# Patient Record
Sex: FEMALE | Race: WHITE | NOT HISPANIC OR LATINO | Employment: FULL TIME | URBAN - NONMETROPOLITAN AREA
[De-identification: names, ages, dates, MRNs, and addresses within clinical notes are randomized per-mention and may not be internally consistent; named-entity substitution may affect disease eponyms.]

---
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
## 2018-01-05 NOTE — NURSING NOTE
Patient Information     Patient Name MRN Christy Munguia [id number] Female 2000      Nursing Note by Keily Mueller at 2017 12:45 PM     Author:  Keily Mueller Service:  (none) Author Type:  (none)     Filed:  2017  1:16 PM Encounter Date:  2017 Status:  Signed     :  Keily Mueller            Patient will have her yearly eye exam done this summer.  Keily Mueller CMA (AAMA)................ 2017 1:04 PM

## 2018-01-05 NOTE — PROGRESS NOTES
"Patient Information     Patient Name MRChristy Pate [id number] Female 2000      Progress Notes by Keily Mueller at 2017  1:04 PM     Author:  Keily Mueller Service:  (none) Author Type:  (none)     Filed:  2017  6:02 PM Encounter Date:  2017 Status:  Signed     :  Charley Gonzales MD (Physician)            Chari is here with her mother for well visit.  Chari just finished her freshman year at the [employer]. She states that she did have a very rough first year academically. She was used to being at the very top of her class in high school. She states that some of her classes were very difficult and she struggled to pass them. She is having a hard time accepting being \"average.\" She has made a good group of friends. She denies any sexual activity or any sexual assaults of any sort. She does feel that she has a good support structure with her friends and family. She states that she has had some thoughts about suicide, but feels very confident that she would never act on this.    She also complains that she has had some heavier periods off and on in the past few months. Discussed birth control as a way of lightening the flow and shortening the duration of periods. She would be interested in this.      DEVELOPMENT  Social:     enjoys school: yes    performance consistent: yes    interaction with peers: yes  Fine Motor:     able to complete age specific tasks: yes  Language:     communication skills are normal: yes  Gross Motor:     normal: yes    participates in extracurricular activities: yes  Answers provided by: mother & patient.  Above information obtained by:  Charley Gonzales MD     HOME HISTORY  Christy Bailey lives with her both parents during summer - on campus during school year.   Nutrition:   Does child have a source of calcium, Vitamin D, protein and iron in diet? yes.   Iron sources in diet, such as meats, cereal or dark " green, leafy vegetables: yes   Christy eats breakfast: yes  Has fluoride been applied to your (if asking patient) or your child's (if asking parent) teeth since January 1 of THIS year? no  Sleep concerns: no  Vision or hearing concerns: no  Do you or your child feel safe in your environment? yes  If there are weapons in the home, are they safely stored? no  Do you have any concerns about you (if asking patient) or your child (if asking parent) being exposed to Tuberculosis (TB): no   Seat belt used 100% of the time  School Name/Occupation: college - just finished first year.  Violence at school/work: no  Exposure to Drugs/alcohol at school/work: no; personal use: no  Do you have any concerns regarding mental health issues in your child, yourself, or a family member: yes.  Above information obtained by:  Charley Gonzales MD      Vaccines for Children Patient Eligibility Screening  Is patient eligible for the Vaccines for Children Program? No, patient has insurance that covers the cost of all vaccines.  Patient received a handout explaining the [facility] program eligibility categories and who to contact with billing questions.    HPI   Christy Bailey is a 16 y.o. female here for a Well Child Exam. She is brought here by her mother. Concerns raised today include see above. Nursing notes reviewed: yes    DEVELOPMENT  This child's development was assessed today using [employer] and the results showed normal development    COMPLETE REVIEW OF SYSTEMS  General: Normal; no fever, no loss of appetite, no change in activity level.  Eyes: Normal; no redness. No concerns about eyes or vision.  Ears: Normal; No concerns about ears or hearing  Nose: Normal; no significant congestion.  Throat: Normal; No concerns about mouth and throat  Respiratory: Normal; no persistent coughing, wheezing, or troubled breathing.  Cardiovascular: Normal; no excessive fatigue or syncope with activity   GI: Normal; BMs normal.  Genitourinary:  Normal; normal urine output   Musculoskeletal: Normal; no concerns.  Neuro: Normal; no abnormal movements    Skin: Normal; no rashes or lesions noted   Psych: See above.    Problem List  Patient Active Problem List       Diagnosis  Date Noted     Menorrhagia with regular cycle  2017     Mild depression (HC)  2017     DERMATITIS  2012     MURMUR       Still's murmur.          Current Medications:    Medications have been reviewed by me and are current to the best of my knowledge and ability.     Histories    Family History       Problem   Relation Age of Onset     GI Disease  Mother      ulcerative colitis       Allergies  Mother      seasonal       Asthma  Mother      Good Health  Father      Other  Brother      complicated  course.  See chart for details.        Good Health  Sister      Other  Paternal Grandfather      sarcoidosis       Cancer-ovarian  Paternal Grandmother 52      of ovarian cancer. diabetes on her side of family       Social History     Social History        Marital status:  Single     Spouse name: N/A     Number of children:  N/A     Years of education:  N/A     Social History Main Topics       Smoking status: Never Smoker     Smokeless tobacco: Never Used     Alcohol use No     Drug use: No     Sexual activity: No     Other Topics  Concern     Not on file      Social History Narrative     She lives with mother (Diana), father (Adonay), one sister, and one brother.  She is the oldest child.  ~Mom Diana.  ~Dad Adonay.  ~Sister Temo (Nina).  Date of birth:  [birth year].  ~Brother Maynor.  Date of birth:  [birth year].    Attending [employer] 2016, plans to pursue Engineering degree.      No past surgical history on file.   Family, Social, and Medical/Surgical history reviewed: yes  Allergies: Review of patient's allergies indicates no known allergies.     Immunization Status  Immunization Status Reviewed: yes  Immunizations up to date: yes  Counseled parent  "about risks and benefits of no vaccinations today.    PHYSICAL EXAM  /74  Temp 98.6  F (37  C) (Tympanic)  Ht 1.651 m (5' 5\")  Wt 52.2 kg (115 lb)  LMP 05/08/2017  Breastfeeding? No  BMI 19.14 kg/m2  Growth Percentiles  Length: 61 %ile based on [facility] 2-20 Years stature-for-age data using vitals from 5/22/2017.   Weight: 27 %ile based on CDC 2-20 Years weight-for-age data using vitals from 5/22/2017.   Weight for length: Normalized weight-for-recumbent length data not available for patients older than 36 months.  BMI: Body mass index is 19.14 kg/(m^2).  BMI for age: 18 %ile based on CDC 2-20 Years BMI-for-age data using vitals from 5/22/2017.    GENERAL: Normal; alert, interactive, well developed adolescent.   HEAD: Normal; normal shaped head.   EYES: \"Normal; Pupils equal, round and reactive to light  EARS: Normal; normally formed ears. TMs normal.  NOSE: Normal; no significant rhinorrhea.  OROPHARYNX:  Normal; mouth and throat normal. Normal dentition.  NECK: Normal; supple, no masses.  LYMPH NODES: Normal.  BREASTS: Austyn Stage 5  CHEST: Normal; normal to inspection.  LUNGS: Normal; no wheezes, rales, rhonchi or retractions. Breath sounds symmetrical.  CARDIOVASCULAR: Normal; no murmurs noted  ABDOMEN: Normal; soft, nontender, without masses. No enlargement of liver or spleen.  HIPS: Normal.  SPINE: Normal; no curvature.  EXTREMITIES: Normal.  SKIN: Normal; no rashes, normal color.  NEURO: Normal; grossly intact, no focal deficits.  PSYCH: Christy is alert and oriented, affect appropriate to content of speech and circumstances, mood appropriate.    PHQ Depression Screen  Date of PHQ exam: 05/22/17  Over the last 2 weeks, how often have you been bothered by any of the following problems?  1. Little interest or pleasure in doing things: 2 - More than half the days  2. Feeling down, depressed, or hopeless: 1 - Several days  3. Trouble falling or staying asleep, or sleeping too much: 0 - Not at all  4. " Feeling tired or having little energy: 1 - Several days  5. Poor appetite or overeating: 3 - Nearly every day  6. Feeling bad about yourself - or that you are a failure or have let yourself or your family down: 2 - More than half the days  7. Trouble concentrating on things, such as reading the newspaper or watching television: 0 - Not at all  8. Moving or speaking so slowly that other people could have noticed. Or the opposite - being so fidgety or restless that you have been moving around a lot more than usual: 0 - Not at all  9. Thoughts that you would be better off dead, or of hurting yourself in some way: 0 - Not at all    PHQ-9 TOTAL SCORE: 9  Depression Severity Level: mild  If any answers were positive, how difficult have these problems made it for you to do your work, take care of things at home, or get along with other people: not difficult at all      TOMMY Score and Severity  TOMMY-7 SCORE: 4  ANXIETY SEVERITY LEVEL: none      ANTICIPATORY GUIDANCE  Written standard Anticipatory Guidance material given to caregiver. yes     ASSESSMENT/PLAN:    Well 16 y.o. adolescent with normal growth and normal development.   Patient's BMI is 18 %ile based on CDC 2-20 Years BMI-for-age data using vitals from 5/22/2017. Counseling about nutrition and physical activity provided to patient and/or parent.     ICD-10-CM    1. Encounter for routine child health examination without abnormal findings Z00.129 AMB CONSULT TO OTHER MENTAL HEALTH   2. Mild depression (HC) F32.0 FLUoxetine (PROZAC) 10 mg capsule   3. Menorrhagia with regular cycle N92.0 norgestrel-ethinyl estradiol, 0.3-30 mg-mcg, (LO-OVRAL) 0.3-30 mg-mcg tablet     Sports PE done today: no  Copy of sports PE scanned into chart: no  Schedule next well visit at 17 years of age.  Discussed options for treatment of her depression to include counseling, medications or both. She had been seeing some difficulty for counseling when she was at college and found this helpful.  She is referred again to see somebody here locally over the summer. She was also interested in trying a medication. Low-dose Prozac 10 mg by mouth daily was given. She should follow-up in one month to recheck how she is doing. She may follow up sooner if any concerns develop. She does contract for safety at this time.  Prescription for Lo/Ovral given to help regulate her periods. Since she is not yet sexually active, no STD testing was completed at this time.  Charley Gonzales MD

## 2018-01-05 NOTE — PATIENT INSTRUCTIONS
Patient Information     Patient Name MRChristy Pate [id number] Female 2000      Patient Instructions by Charley Gonzales MD at 2017  1:15 PM     Author:  Charley Gonzales MD Service:  (none) Author Type:  Physician     Filed:  2017  1:15 PM Encounter Date:  2017 Status:  Signed     :  Charley Gonzales MD (Physician)              Growth Percentiles  Weight: 27 %ile based on CDC 2-20 Years weight-for-age data using vitals from 2017.  Height: 61 %ile based on CDC 2-20 Years stature-for-age data using vitals from 2017.  BMI: Body mass index is 19.14 kg/(m^2). 18 %ile based on CDC 2-20 Years BMI-for-age data using vitals from 2017.     Health and Wellness: 12 to 18 Years    Immunizations (Shots) Today  Your child may receive these shots at this time:    Tdap (tetanus, diphtheria, and acellular pertussis): ages 11 to 12 years    Influenza  Your child may be eligible for:     MCV4 (meningococcal conjugate vaccine, quadrivalent): ages 11 to 12 years and age 16 years    HPV (human papilloma virus vaccine)    2 dose series: ages 11 to 14 years    3 dose series: ages 15 to 26 years    Talk with your health care provider for information about giving acetaminophen (Tylenol ) before and after your child s immunizations.    Development    All aspects of your child s development (physical, social and mental skills) will continue to grow.    Your child may have questions or concerns about puberty and sexual health. Girls will need to be prepared for menstruation.    Friendships will become more important. Peer pressure may begin or continue.    The American Academy of Pediatrics (AAP) recommends setting a screen time limit that is right for your child and the whole family.     Screen time includes watching television and using cellphones, video games, computers and other electronic devices.    It s important that screen time never replaces healthful  behaviors such as physical activity, sleep and interaction with others.    The AAP advises keeping all electronic devices out of children's bedrooms.    Continue a routine for talking about school and doing homework. The AAP advises you not let your child watch TV while doing homework.    Encourage your child to read for pleasure. This time should be free of television, texting and other distractions. Reading helps your child get ready to talk, improves your child's word skills and teaches him or her to listen and learn. The amount of language your child is exposed to in early years has a lot to do with how he or she will develop and succeed.      Teach your child respect for property and other people.    Give your child opportunities for independence within set boundaries.    Talk honestly with your child about responsibilities and expectations around: school and homework, dating, driving, activities outside of school, keeping a job.    Food and Beverages    Children ages 12 to 18 need between 1,600 to 2,500 calories each day. (Active children need more.) A total of 25 to 35 percent of total calories should come from fats.    Between ages 12 to 18 years, your child needs 1,300 milligrams (mg) of calcium each day. She can get this requirement by drinking 3 cups of low-fat or fat-free milk, plus servings of other foods high in calcium (such as yogurt, cheese, orange juice or soy milk with added calcium, broccoli, and almonds) or by taking a calcium supplement.    Your child needs at least 600 IU of vitamin D daily.    Between ages 12 to 13 years, boys and girls need 8 mg of iron a day. After age 13, the recommended requirement changes:    boys 14 to 18 years: 11 mg    girls 14 to 18 years: 15 mg     Lean beef, iron-fortified cereal, oatmeal, soybeans, spinach and tofu are good sources of iron.    Breakfast is important. Make sure your child eats a healthful breakfast every morning.    Help your child choose  fiber-rich fruits, vegetables and whole grains. Choose and prepare foods and beverages with little added sugars or sweeteners.    Offer your child healthful snacks such as fruits, vegetables, healthful cereals, yogurt, pudding, turkey, peanut butter sandwich, fruit smoothie, or cheese. Avoid foods high in sugar or fat.    Limit soft drinks and sweetened beverages (including juice) to no more than one a day. Limit sweets, treats, snack foods (such as chips), fast foods and fried foods.    Exercise    The American Heart Association recommends children get 60 minutes of moderate to vigorous physical activity each day. If your child s school does not offer regular physical education classes, organize daily family activities (such as walking or bike riding) or consider enrolling him or her in classes, team sports, or community education activities.    In addition to helping build strong bones and muscles, regular exercise can reduce risks of certain diseases, reduce stress levels, increase self-esteem, help maintain a healthy weight, improve concentration, and help maintain good cholesterol levels.    Even if your child doesn t think it s  cool,  she needs to wear the right safety gear for her activities, such as a helmet, mouth guard, knee pads, eye protection or life vest.     You can find more information on health and wellness for children and teens at healthpoweredkids.org.    Sleep    Children ages 12 to 18 need at least 9   hours of sleep each night on a regular basis.    Your child should continue a sleep routine (such as washing her face and brushing teeth).    It is still important to keep a regular sleep and waking schedule. Teach your child to get up when called or when the alarm goes off.    Avoid regular exercise, heavy meals and caffeine right before bed.  Safety    Your child needs to be in a belt-positioning booster seat in the back seat until she reaches the height of 4 feet 9 inches or taller.     Once  your child is 4 feet 9 inches or taller, your child can be buckled in the back seat with a lap and shoulder belt. The lap belt must lie snugly across the upper thighs, not the stomach. The shoulder belt should lie snugly across the shoulder and chest and not cross the neck or face.     Keep your child in the back seat at least through age 12.     At 13 years old, your child may ride in the front seat, buckled with a lap and shoulder belt. (Follow directions from your health care provider.) Be sure all other adults and children are buckled as well.    Do not let anyone smoke in your home or around your child.    Talk with your child about the dangers of alcohol, drug and tobacco use.    Make sure your child understands safety guidelines for fire, water, animal safety, firearms, social networking Internet sites, and personal safety (including dating). About one in five high school girls has been physically or sexually abused by a dating partner, according to the American Medical Association.     Self-esteem    Provide support, attention and enthusiasm for your child s abilities, achievements and school activities. Show your child affection.    Get to know your child s friends and their parents.    Let your child try new skills.       Older teenagers may want to begin dating. Set boundaries and talk honestly with your child about your family s values and morals.    Monitor your child for eating disorders. Medical illnesses, they involve abnormal eating behaviors serious enough to cause heart conditions, kidney failure and death. The three most common eating disorders are anorexia nervosa, bulimia nervosa and binge eating disorder. They often develop during adolescent years or early adulthood. The vast majority of people with eating disorders are teenage girls and young women.     For information on how to stress less and help teens live a more balanced life, check out www.changetochill.org.    Discipline    Teach your  child consequences for unacceptable or inappropriate behavior. Talk about your family s values and morals and what is right and wrong.    Use discipline to teach, not punish. Be fair and consistent with discipline.    Never shake or hit your child. If you think you are losing control, make sure your child is safe and take a 10-minute time out. If you are still not calm, call a friend, neighbor or relative to come over and help you. If you have no other options, call [facility] at [phone] or dial 211.    Dental Care    Make sure your child brushes her teeth twice a day and flosses once a day.    Make regular dental appointments for cleanings and checkups.    Your child may be self-conscious if she has crooked teeth. An orthodontist can talk with you about choices for straightening teeth.    Eye Care    Make eye checkups at least every 2 years.       Lab Work  Your child should have the following once between ages 13 to 16 years    Urinalysis - This is a urine test to look for kidney problems, diabetes and/or infection    Hemoglobin - This is a blood test to check for anemia, or low blood iron  Your child should have the following once between ages 17 to 19 years    Cholesterol level - This is a blood test to measure a fat-like substance in the blood.  High total cholesterol can indicate a risk for future heart problem.    Next Well Checkup    Your child should have a yearly well checkup through age 20.    Your child may need these shots:     Influenza    For more information go to www.healthychildren.org       2013 [employer]  AND THE [employer] ARE REGISTERED TR[employer]ARKS OF "[employer]"  OTHER TRADEMARKS USED ARE OWNED BY THEIR RESPECTIVE OWNERS  [id number] (5/12)

## 2018-01-11 ENCOUNTER — OFFICE VISIT - GICH (OUTPATIENT)
Dept: FAMILY MEDICINE | Facility: OTHER | Age: 18
End: 2018-01-11

## 2018-01-11 ENCOUNTER — HISTORY (OUTPATIENT)
Dept: FAMILY MEDICINE | Facility: OTHER | Age: 18
End: 2018-01-11

## 2018-01-11 DIAGNOSIS — F32.0 MAJOR DEPRESSIVE DISORDER, SINGLE EPISODE, MILD (H): ICD-10-CM

## 2018-01-11 ASSESSMENT — PATIENT HEALTH QUESTIONNAIRE - PHQ9: SUM OF ALL RESPONSES TO PHQ QUESTIONS 1-9: 9

## 2018-01-16 ENCOUNTER — AMBULATORY - GICH (OUTPATIENT)
Dept: LAB | Facility: OTHER | Age: 18
End: 2018-01-16

## 2018-01-16 DIAGNOSIS — E55.9 VITAMIN D DEFICIENCY: ICD-10-CM

## 2018-01-16 DIAGNOSIS — R73.9 HYPERGLYCEMIA: ICD-10-CM

## 2018-01-16 LAB
GLUCOSE SERPL-MCNC: 96 MG/DL (ref 70–105)
VITAMIN D TOTAL - HISTORICAL: 37.5 NG/ML

## 2018-01-18 ENCOUNTER — COMMUNICATION - GICH (OUTPATIENT)
Dept: FAMILY MEDICINE | Facility: OTHER | Age: 18
End: 2018-01-18

## 2018-01-26 VITALS
DIASTOLIC BLOOD PRESSURE: 76 MMHG | TEMPERATURE: 98.6 F | BODY MASS INDEX: 19.07 KG/M2 | SYSTOLIC BLOOD PRESSURE: 104 MMHG | WEIGHT: 114.6 LBS

## 2018-01-26 VITALS
BODY MASS INDEX: 18.33 KG/M2 | TEMPERATURE: 98.6 F | SYSTOLIC BLOOD PRESSURE: 106 MMHG | BODY MASS INDEX: 19.16 KG/M2 | HEART RATE: 76 BPM | WEIGHT: 115 LBS | HEIGHT: 65 IN | WEIGHT: 110 LBS | DIASTOLIC BLOOD PRESSURE: 60 MMHG | HEIGHT: 65 IN | DIASTOLIC BLOOD PRESSURE: 74 MMHG | SYSTOLIC BLOOD PRESSURE: 110 MMHG

## 2018-01-31 ASSESSMENT — ANXIETY QUESTIONNAIRES
GAD7 TOTAL SCORE: 4
GAD7 TOTAL SCORE: 1

## 2018-01-31 ASSESSMENT — PATIENT HEALTH QUESTIONNAIRE - PHQ9
SUM OF ALL RESPONSES TO PHQ QUESTIONS 1-9: 6
SUM OF ALL RESPONSES TO PHQ QUESTIONS 1-9: 9
SUM OF ALL RESPONSES TO PHQ QUESTIONS 1-9: 5

## 2018-02-09 VITALS
SYSTOLIC BLOOD PRESSURE: 120 MMHG | WEIGHT: 109.8 LBS | BODY MASS INDEX: 18.27 KG/M2 | DIASTOLIC BLOOD PRESSURE: 70 MMHG | HEART RATE: 84 BPM

## 2018-02-09 VITALS
HEIGHT: 65 IN | BODY MASS INDEX: 18.33 KG/M2 | HEART RATE: 74 BPM | SYSTOLIC BLOOD PRESSURE: 110 MMHG | WEIGHT: 110 LBS | DIASTOLIC BLOOD PRESSURE: 70 MMHG

## 2018-02-11 ASSESSMENT — PATIENT HEALTH QUESTIONNAIRE - PHQ9
SUM OF ALL RESPONSES TO PHQ QUESTIONS 1-9: 16
SUM OF ALL RESPONSES TO PHQ QUESTIONS 1-9: 9

## 2018-02-12 NOTE — ADDENDUM NOTE
Patient Information     Patient Name MRN Christy Munguia [id number] Female 2000      Addendum Note by Charley De La Rosa MD at 2018  3:38 PM     Author:  Charley De La Rosa MD Service:  (none) Author Type:  Physician     Filed:  2018  3:38 PM Encounter Date:  2017 Status:  Signed     :  Charley De La Rosa MD (Physician)       Addended by: CHARLEY DE LA ROSA on: 2018 03:38 PM        Modules accepted: Orders

## 2018-02-12 NOTE — PROGRESS NOTES
Patient Information     Patient Name MRN Sex Christy Chong [id number] Female 2000      Progress Notes by Charley Gonzales MD at 2018  1:15 PM     Author:  Charley Gonzales MD Service:  (none) Author Type:  Physician     Filed:  2018  9:10 AM Encounter Date:  2018 Status:  Signed     :  Charley Gonzales MD (Physician)            SUBJECTIVE:    Christy Bailey is a 17 y.o. female who presents for follow up.  She has been less stressed out since back home again. Has to go back to school at the end of the week to move her things back home.  Still keeps in touch with friends from school.  Has been seeing her counselor.  She sees Abebe Martines at [facility].  Saw him last yesterday.    HPI  I personally reviewed medications/allergies/history listed below:     No Known Allergies,   Family History       Problem   Relation Age of Onset     GI Disease  Mother      ulcerative colitis       Allergies  Mother      seasonal       Asthma  Mother      Good Health  Father      Other  Brother      complicated  course.  See chart for details.        Good Health  Sister      Other  Paternal Grandfather      sarcoidosis       Cancer-ovarian  Paternal Grandmother 52      of ovarian cancer. diabetes on her side of family     ,   Current Outpatient Prescriptions on File Prior to Visit       Medication  Sig Dispense Refill     Cholecalciferol, Vitamin D3, (VITAMIN D-3) 5,000 unit tab Take 1 tablet by mouth once daily. 90 tablet 0     citalopram (CELEXA) 20 mg tablet Take 1/2 by mouth daily x 7 days, then increase to 20 mg daily. 30 tablet 11     medication order composer SAD light.  Diagnosis:  F33.9, F41.8.  Length of need:  99. 1 Each 0     multivitamin (MVI) tablet Take 1 tablet by mouth once daily.  0     norgestrel-ethinyl estradiol, 0.3-30 mg-mcg, (LO-OVRAL) 0.3-30 mg-mcg tablet Take 1 tablet by mouth once daily. 84 tablet 3     No current  "facility-administered medications on file prior to visit.    , ,   Patient Active Problem List     Diagnosis  Code     MURMUR R01.1     DERMATITIS L25.9     Menorrhagia with regular cycle N92.0     Mild depression (HC) F32.0     Iron deficiency E61.1    and No past surgical history on file.  Social History     Social History        Marital status:  Single     Spouse name: N/A     Number of children:  N/A     Years of education:  N/A     Occupational History      Not on file.     Social History Main Topics       Smoking status: Never Smoker     Smokeless tobacco: Never Used     Alcohol use No     Drug use: No     Sexual activity: No     Other Topics  Concern     Not on file      Social History Narrative     She lives with mother (Diana), father (Adonay), one sister, and one brother.  She is the oldest child.  ~Mom Diana.  ~Dad Adonay.  ~Sister Temo (Nina).  Date of birth:  05/[birth year].  ~Brother Maynor.  Date of birth:  [birth year].    Attending [employer] fall 2016, plans to pursue Engineering degree.       REVIEW OF SYSTEMS:  Review of Systems   Constitutional: Negative for chills, fever and malaise/fatigue.   Respiratory: Negative for cough.    Psychiatric/Behavioral: Positive for depression. Negative for hallucinations, substance abuse and suicidal ideas. The patient is nervous/anxious.        OBJECTIVE:  /70 (Cuff Site: Right Arm, Position: Sitting, Cuff Size: Adult Regular)  Pulse 74  Ht 1.651 m (5' 5\")  Wt 49.9 kg (110 lb)  LMP 12/13/2017  BMI 18.3 kg/m2    EXAM:   Physical Exam   Constitutional: She is well-developed, well-nourished, and in no distress.   HENT:   Head: Normocephalic.   Eyes: Pupils are equal, round, and reactive to light.   Neck: Normal range of motion. Neck supple. No thyromegaly present.   Cardiovascular: Normal rate, regular rhythm and normal heart sounds.    No murmur heard.  Pulmonary/Chest: Effort normal and breath sounds normal. No respiratory distress. She has no " wheezes. She has no rales.   Lymphadenopathy:     She has no cervical adenopathy.   Psychiatric: Affect normal.       PHQ Depression Screening 12/21/2017 1/11/2018   Date of PHQ exam (doc flow) 12/21/2017 -   1. Lack of interest/pleasure 2 - More than half the days 1 - Several days   2. Feeling down/depressed 3 - Nearly every day 1 - Several days   PHQ-2 TOTAL SCORE 5 2   3. Trouble sleeping 1 - Several days 2 - More than half the days   4. Decreased energy 3 - Nearly every day 2 - More than half the days   5. Appetite change 2 - More than half the days 2 - More than half the days   6. Feelings of failure 3 - Nearly every day 1 - Several days   7. Trouble concentrating 0 - Not at all 0 - Not at all   8. Activity level 1 - Several days 0 - Not at all   9. Hurting yourself 1 - Several days 0 - Not at all   PHQ-9 TOTAL SCORE 16 9   PHQ-9 Severity Level moderately severe mild   Functional Impairment somewhat difficult -   Some recent data might be hidden         TOMMY-7 ANXIETY SCREENING 5/22/2017 6/26/2017   TOMMY date (doc flow) 5/22/2017 6/26/2017   Nervous, anxious 1 0   Cannot stop worrying 0 0   Worry about different things 2 1   Cannot relax 0 0   Feeling restless 1 0   Easily annoyed/irritated 0 0   Afraid of awful event 0 0   Score 4 1   Severity none none   Some recent data might be hidden           ASSESSMENT/PLAN:    ICD-10-CM    1. Mild depression (HC) F32.0         Plan:    1. PHQ9 has improved. Her tommy screen has also improved. She feels that she would want to keep her dose of Celexa the same for now. She will continue following with her counselor. She is planning to seek employment and considering her options with further education. She has dropped out of college at [employer] for the time being. She will follow-up as needed.  Charley Gonzales MD

## 2018-02-12 NOTE — NURSING NOTE
Patient Information     Patient Name MRN Christy Munguia [id number] Female 2000      Nursing Note by Yaa Ramos at 2017  2:30 PM     Author:  Yaa Ramso Service:  (none) Author Type:  (none)     Filed:  2017  2:44 PM Encounter Date:  2017 Status:  Signed     :  Yaa Ramos            Patient is here today for a follow up on depression. She feels that this may be getting worse.  Yaa Ramos LPN.......................... 2017  2:41 PM

## 2018-02-12 NOTE — PROGRESS NOTES
Patient Information     Patient Name MRN Christy Munguia [id number] Female 2000      Progress Notes by Charley Gonzales MD at 2017  2:30 PM     Author:  Charley Gonzales MD Service:  (none) Author Type:  Physician     Filed:  2017  6:39 PM Encounter Date:  2017 Status:  Signed     :  Charley Gonzales MD (Physician)            SUBJECTIVE:    Christy Bailey is a 17 y.o. female who presents for follow up.  Feels tired all the time.  Has had a hard time making it to classes.  She has missed classes because of it.  Can't get out of bed in the mornings.  She notes that her symptoms got much worse when the days are getting darker. She wonders if she has a seasonal component to her depression. She is currently in her second year at [employer].  She states that she has not been doing well in her classes. She is failing 2-3 of her 5 classes. She's considering dropping out of school since she can try to figure out what she wants to do in the future.    She also has noted a tremor occasionally, particularly in the mornings when she hasn't eaten or before she eats lunch. She states that some days she has to really force herself to eat and she is snacking more than she is eating and actually meal.    History of iron deficiency. She currently taking over-the-counter iron. She is on a birth control pill and that has really helped with her heavy periods, which were the suspected source of her iron deficiency.    HPI  I personally reviewed medications/allergies/history listed below:     No Known Allergies,   Family History       Problem   Relation Age of Onset     GI Disease  Mother      ulcerative colitis       Allergies  Mother      seasonal       Asthma  Mother      Good Health  Father      Other  Brother      complicated  course.  See chart for details.        Good Health  Sister      Other  Paternal Grandfather      sarcoidosis       Cancer-ovarian   Paternal Grandmother 52      of ovarian cancer. diabetes on her side of family     ,   Current Outpatient Prescriptions on File Prior to Visit       Medication  Sig Dispense Refill     ferrous sulfate 325 mg delayed release tablet Take 1 tablet by mouth once daily. 30 tablet 5     norgestrel-ethinyl estradiol, 0.3-30 mg-mcg, (LO-OVRAL) 0.3-30 mg-mcg tablet Take 1 tablet by mouth once daily. 84 tablet 3     No current facility-administered medications on file prior to visit.    , ,   Patient Active Problem List     Diagnosis  Code     MURMUR R01.1     DERMATITIS L25.9     Menorrhagia with regular cycle N92.0     Mild depression (HC) F32.0     Iron deficiency E61.1    and No past surgical history on file.  Social History     Social History        Marital status:  Single     Spouse name: N/A     Number of children:  N/A     Years of education:  N/A     Occupational History      Not on file.     Social History Main Topics       Smoking status: Never Smoker     Smokeless tobacco: Never Used     Alcohol use No     Drug use: No     Sexual activity: No     Other Topics  Concern     Not on file      Social History Narrative     She lives with mother (Diana), father (Adonay), one sister, and one brother.  She is the oldest child.  ~Mom Diana.  ~Dad Adonay.  ~Sister Temo (Nina).  Date of birth:  [birth year].  ~Brother Maynor.  Date of birth:  [birth year].    Attending [employer] 2016, plans to pursue Engineering degree.       REVIEW OF SYSTEMS:  Review of Systems   Constitutional: Negative for chills and fever.   Neurological: Positive for tremors.   Psychiatric/Behavioral: Positive for depression. Negative for hallucinations, substance abuse and suicidal ideas. The patient has insomnia. The patient is not nervous/anxious.        OBJECTIVE:  /70 (Cuff Site: Right Arm, Position: Sitting, Cuff Size: Adult Regular)  Pulse 84  Wt 49.8 kg (109 lb 12.8 oz)  LMP 2017  Breastfeeding? No    EXAM:    Physical Exam   Constitutional: She is well-developed, well-nourished, and in no distress.   HENT:   Head: Normocephalic.   Eyes: Pupils are equal, round, and reactive to light.   Neck: Normal range of motion. Neck supple. No thyromegaly present.   Cardiovascular: Normal rate, regular rhythm and normal heart sounds.    No murmur heard.  Pulmonary/Chest: Effort normal and breath sounds normal. No respiratory distress. She has no wheezes. She has no rales.   Musculoskeletal: She exhibits no edema.   Lymphadenopathy:     She has no cervical adenopathy.   Neurological: She is alert.   Very fine tremor of both hands noted.   Psychiatric: Affect normal.            PHQ Depression Screening 8/1/2017 12/21/2017   Date of PHQ exam (doc flow) - 12/21/2017   1. Lack of interest/pleasure 1 - Several days 2 - More than half the days   2. Feeling down/depressed 1 - Several days 3 - Nearly every day   PHQ-2 TOTAL SCORE 2 5   3. Trouble sleeping 1 - Several days 1 - Several days   4. Decreased energy 0 - Not at all 3 - Nearly every day   5. Appetite change 1 - Several days 2 - More than half the days   6. Feelings of failure 1 - Several days 3 - Nearly every day   7. Trouble concentrating 0 - Not at all 0 - Not at all   8. Activity level 0 - Not at all 1 - Several days   9. Hurting yourself 0 - Not at all 1 - Several days   PHQ-9 TOTAL SCORE 5 16   PHQ-9 Severity Level mild moderately severe   Functional Impairment somewhat difficult somewhat difficult   Some recent data might be hidden       TOMMY-7 ANXIETY SCREENING 5/22/2017 6/26/2017   TOMMY date (doc flow) 5/22/2017 6/26/2017   Nervous, anxious 1 0   Cannot stop worrying 0 0   Worry about different things 2 1   Cannot relax 0 0   Feeling restless 1 0   Easily annoyed/irritated 0 0   Afraid of awful event 0 0   Score 4 1   Severity none none   Some recent data might be hidden           ASSESSMENT/PLAN:    ICD-10-CM    1. Anxiety and depression F41.8 VITAMIN D 25 (DEFICIENCY)       FLUoxetine (PROZAC) 10 mg capsule      FLUoxetine (PROZAC) 20 mg capsule      citalopram (CELEXA) 20 mg tablet      medication order composer      VITAMIN D 25 (DEFICIENCY)   2. Seasonal affective disorder (HC) F33.9 medication order composer   3. Tremor R25.1 GLUCOSE, RANDOM      GLUCOSE, RANDOM   4. Iron deficiency E61.1 FERRITIN      HEMOGLOBIN      FERRITIN      HEMOGLOBIN        Plan:    1. Check vitamin D level to make sure she is not low given seasonal component to her worsening depression. Transition from Prozac to Celexa as noted below. Follow-up in approximately 3 weeks. Prescription for a SAD light was given as well. She contracts for safety. She is going to reestablish with her counselor as well.  2. See #1.  3. Check random glucose. Tremor may be related to medication side effect versus hypoglycemia versus anxiety versus other. TSH has been checked in August and was normal at that time.  4. We'll recheck hemoglobin and ferritin as noted above.  Patient Instructions   To switch from prozac to celexa:  Week #1 - take prozac 20 mg daily with celexa 10 mg daily (take 1/2 tablet).  Week #2 - take prozac 10 mg daily with celexa 20 mg daily.  Week #3 - stop prozac.  Stay on celexa 20 mg daily.   Charley Gonzales MD

## 2018-02-12 NOTE — PATIENT INSTRUCTIONS
Patient Information     Patient Name MRN Christy Munguia [id number] Female 2000      Patient Instructions by Charley Gonzales MD at 2017  2:30 PM     Author:  Charley Gonzales MD  Service:  (none) Author Type:  Physician     Filed:  2017  3:05 PM  Encounter Date:  2017 Status:  Addendum     :  Charley Gonzales MD (Physician)        Related Notes: Original Note by Charley Gonzales MD (Physician) filed at 2017  3:01 PM            To switch from prozac to celexa:  Week #1 - take prozac 20 mg daily with celexa 10 mg daily (take 1/2 tablet).  Week #2 - take prozac 10 mg daily with celexa 20 mg daily.  Week #3 - stop prozac.  Stay on celexa 20 mg daily.

## 2018-02-13 NOTE — TELEPHONE ENCOUNTER
Patient Information     Patient Name MRN Christy Munguia [id number] Female 2000      Telephone Encounter by Lakeisha Posada at 2018 12:26 PM     Author:  Lakeisha Posada Service:  (none) Author Type:  (none)     Filed:  2018 12:26 PM Encounter Date:  2018 Status:  Signed     :  Lakeisha Posada            Patient given results .  Lakeisha Posada LPN ....................2018  12:26 PM

## 2018-02-22 ENCOUNTER — DOCUMENTATION ONLY (OUTPATIENT)
Dept: FAMILY MEDICINE | Facility: OTHER | Age: 18
End: 2018-02-22

## 2018-02-22 PROBLEM — E61.1 IRON DEFICIENCY: Status: ACTIVE | Noted: 2017-08-07

## 2018-02-22 PROBLEM — R01.1 MURMUR: Status: ACTIVE | Noted: 2018-02-22

## 2018-02-22 PROBLEM — N92.0 MENORRHAGIA WITH REGULAR CYCLE: Status: ACTIVE | Noted: 2017-05-22

## 2018-02-22 PROBLEM — F32.A MILD DEPRESSION: Status: ACTIVE | Noted: 2017-05-22

## 2018-02-22 RX ORDER — DIPHENOXYLATE HYDROCHLORIDE AND ATROPINE SULFATE 2.5; .025 MG/1; MG/1
1 TABLET ORAL DAILY
COMMUNITY
Start: 2017-12-21

## 2018-02-22 RX ORDER — CITALOPRAM HYDROBROMIDE 20 MG/1
0.5 TABLET ORAL DAILY
COMMUNITY
Start: 2017-12-21 | End: 2018-08-21

## 2018-03-25 ENCOUNTER — HEALTH MAINTENANCE LETTER (OUTPATIENT)
Age: 18
End: 2018-03-25

## 2018-05-04 DIAGNOSIS — N92.0 MENORRHAGIA WITH REGULAR CYCLE: Primary | ICD-10-CM

## 2018-05-07 RX ORDER — NORGESTREL AND ETHINYL ESTRADIOL 0.3-0.03MG
KIT ORAL
Qty: 84 TABLET | Refills: 2 | Status: SHIPPED | OUTPATIENT
Start: 2018-05-07 | End: 2018-12-24

## 2018-05-07 NOTE — TELEPHONE ENCOUNTER
norgestrel-ethinyl estradiol, 0.3-30 mg-mcg, (LO-OVRAL) 0.3-30 mg-mcg tablet  LOV-01/11/2018  Prescription refilled per RN Medication RefillPolicy.................... Tiffany Cui ....................  5/7/2018   9:31 AM

## 2018-07-23 NOTE — PROGRESS NOTES
Patient Information     Patient Name  Christy Bailey MRN  [id number] Sex  Female   2000      Letter by Charley Gonzales MD at      Author:  Charley Gonzales MD Service:  (none) Author Type:  (none)    Filed:   Encounter Date:  2017 Status:  (Other)           Christy Bailey  [address]  [facility] [address]          2018    Dear Ms. Bailey:    Your recent labs showed that your Vitamin D level was slightly low at 23.8.  I would recommend that you take Vitamin D 5000 International Units daily.  This dose is available over the counter.  I would recommend that you have your level repeat in about 3 months to make sure it is increasing with this.    Your glucose was a little high, but a suspect that this was due to not being fasting.  I will also put an order in your chart for a fasting glucose.  You could have this checked at any time.      Your Hemoglobin and ferritin were both normal, although your ferritin is at the lower side of normal.  I think it would be ok to stop the iron supplement as long as you continue taking a multivitamin containing iron for now.    If you have questions, please call [phone].      Sincerely,      Charley Gonzales MD     Resulted Orders      VITAMIN D 25 (DEFICIENCY) (Collected: 2017  3:22 PM)      Result  Value Ref Range    VITAMIN D TOTAL AFL 23.8   ng/mL    Narrative      Deficiency:           <10 ng/mL  Insufficiency:      10-29 ng/mL    Sufficiency:        ng/mL    Possible Toxicity:   >100 ng/mL       GLUCOSE, RANDOM (Collected: 2017  3:22 PM)      Result  Value Ref Range    GLUCOSE,RANDOM 161 (H) 70 - 105 mg/dL   FERRITIN (Collected: 2017  3:22 PM)      Result  Value Ref Range    FERRITIN 32.4 23.9 - 336.2 ng/mL   HEMOGLOBIN (Collected: 2017  3:22 PM)      Result  Value Ref Range    HEMOGLOBIN                13.4 12.0 - 16.0 g/dL    MCV                       93 80 - 100 fL

## 2018-07-23 NOTE — PROGRESS NOTES
Patient Information     Patient Name  Christy Bailey MRN  [id number] Sex  Female   2000      Letter by Charley Gonzales MD at      Author:  Charley Gonzales MD Service:  (none) Author Type:  (none)    Filed:   Encounter Date:  2017 Status:  (Other)           Christy Bailey  [address]  [facility] [address]          2017    Dear Ms. Bailey:    Your labs showed that your TSH (thyroid) was normal.  Your ferritin was a little low.  This is a reflection of your stores of iron.  It is most likely low due to loss of blood during menstruation.  I would recommend taking an iron supplement daily to try to boost your iron stores.  I have sent a prescription for ferrous sulfate 325 mg daily to your pharmacy.  This low iron level is likely the cause for your thinning hair.  You could consider having your iron level rechecked again when you are home over [place] break.  If you have questions, please call [phone].      Sincerely,      Charley Gonzales MD     Resulted Orders      TSH (Collected: 2017  3:55 PM)      Result  Value Ref Range    TSH 3.01 0.34 - 5.60 uIU/mL   FERRITIN (Collected: 2017  3:55 PM)      Result  Value Ref Range    FERRITIN 15.1 (L) 23.9 - 336.2 ng/mL

## 2018-08-21 ENCOUNTER — OFFICE VISIT (OUTPATIENT)
Dept: FAMILY MEDICINE | Facility: OTHER | Age: 18
End: 2018-08-21
Attending: FAMILY MEDICINE
Payer: COMMERCIAL

## 2018-08-21 VITALS
SYSTOLIC BLOOD PRESSURE: 100 MMHG | DIASTOLIC BLOOD PRESSURE: 60 MMHG | WEIGHT: 111 LBS | HEIGHT: 65 IN | BODY MASS INDEX: 18.49 KG/M2 | HEART RATE: 76 BPM

## 2018-08-21 DIAGNOSIS — F41.9 ANXIETY AND DEPRESSION: Primary | ICD-10-CM

## 2018-08-21 DIAGNOSIS — F32.A ANXIETY AND DEPRESSION: Primary | ICD-10-CM

## 2018-08-21 PROCEDURE — 99213 OFFICE O/P EST LOW 20 MIN: CPT | Performed by: FAMILY MEDICINE

## 2018-08-21 RX ORDER — CITALOPRAM HYDROBROMIDE 20 MG/1
20 TABLET ORAL DAILY
Qty: 90 TABLET | Refills: 3 | Status: SHIPPED | OUTPATIENT
Start: 2018-08-21 | End: 2018-11-21

## 2018-08-21 ASSESSMENT — ENCOUNTER SYMPTOMS
NERVOUS/ANXIOUS: 0
FATIGUE: 0
COUGH: 0
UNEXPECTED WEIGHT CHANGE: 0

## 2018-08-21 ASSESSMENT — PATIENT HEALTH QUESTIONNAIRE - PHQ9: 5. POOR APPETITE OR OVEREATING: SEVERAL DAYS

## 2018-08-21 ASSESSMENT — ANXIETY QUESTIONNAIRES
5. BEING SO RESTLESS THAT IT IS HARD TO SIT STILL: SEVERAL DAYS
IF YOU CHECKED OFF ANY PROBLEMS ON THIS QUESTIONNAIRE, HOW DIFFICULT HAVE THESE PROBLEMS MADE IT FOR YOU TO DO YOUR WORK, TAKE CARE OF THINGS AT HOME, OR GET ALONG WITH OTHER PEOPLE: SOMEWHAT DIFFICULT
3. WORRYING TOO MUCH ABOUT DIFFERENT THINGS: NOT AT ALL
7. FEELING AFRAID AS IF SOMETHING AWFUL MIGHT HAPPEN: NOT AT ALL
1. FEELING NERVOUS, ANXIOUS, OR ON EDGE: SEVERAL DAYS
2. NOT BEING ABLE TO STOP OR CONTROL WORRYING: NOT AT ALL
GAD7 TOTAL SCORE: 3
6. BECOMING EASILY ANNOYED OR IRRITABLE: NOT AT ALL

## 2018-08-21 NOTE — MR AVS SNAPSHOT
"              After Visit Summary   2018    Christy Bailey    MRN: [id number]           Patient Information     Date Of Birth          2000        Visit Information        Provider Department      2018 9:30 AM Charley Gonzales MD [facility]        Today's Diagnoses     Mild depression (H)    -  1       Follow-ups after your visit        Who to contact     If you have questions or need follow up information about today's clinic visit or your schedule please contact [facility] directly at [phone].  Normal or non-critical lab and imaging results will be communicated to you by [employer], letter or phone within 4 business days after the clinic has received the results. If you do not hear from us within 7 days, please contact the clinic through [employer] or phone. If you have a critical or abnormal lab result, we will notify you by phone as soon as possible.  Submit refill requests through [employer] or call your pharmacy and they will forward the refill request to us. Please allow 3 business days for your refill to be completed.          Additional Information About Your Visit        MyChart Information     Evergram lets you send messages to your doctor, view your test results, renew your prescriptions, schedule appointments and more. To sign up, go to [email or web address] . Click on \"Log in\" on the left side of the screen, which will take you to the Welcome page. Then click on \"Sign up Now\" on the right side of the page.     You will be asked to enter the access code listed below, as well as some personal information. Please follow the directions to create your username and password.     Your access code is: [email or web address]  Expires: 2018  9:56 AM     Your access code will  in 90 days. If you need help or a new code, please call your [place] clinic or [phone].        Care EveryWhere ID     This is your Care EveryWhere ID. This could " "be used by other organizations to access your [place] medical records  [id number]        Your Vitals Were     Pulse Height Last Period Breastfeeding? BMI (Body Mass Index)       76 5' 4.5\" (1.638 m) 07/31/2018 No 18.76 kg/m2        Blood Pressure from Last 3 Encounters:   08/21/18 100/60   01/11/18 110/70   12/21/17 120/70    Weight from Last 3 Encounters:   08/21/18 111 lb (50.3 kg)   01/11/18 110 lb (49.9 kg) (16 %)*   12/21/17 109 lb 12.8 oz (49.8 kg) (15 %)*     * Growth percentiles are based on CDC 2-20 Years data.              Today, you had the following     No orders found for display         Today's Medication Changes          These changes are accurate as of 8/21/18  9:56 AM.  If you have any questions, ask your nurse or doctor.               These medicines have changed or have updated prescriptions.        Dose/Directions    citalopram 20 MG tablet   Commonly known as:  celeXA   This may have changed:  how much to take   Used for:  Mild depression (H)   Changed by:  Charley Gonzales MD        Dose:  20 mg   Take 1 tablet (20 mg) by mouth daily   Quantity:  90 tablet   Refills:  3            Where to get your medicines      These medications were sent to [id number] ([facility] - [place], [place] - [address]  [address], [facility] [address]     Phone:  [phone]     citalopram 20 MG tablet                Primary Care Provider Office Phone # Fax #    Charley Gonzales [phone] [address] [facility] [address]        Equal Access to Services     [facility] VINNIE AH: Sally Cornell, wamonicada lucelina, qaybta kaalmada gelacio, cyrus wilson. So [facility] [phone].    ATENCIÓN: Si habla español, tiene a franco disposición servicios gratuitos de asistencia lingüística. Llame al [phone].    We comply with applicable federal civil rights laws and [place] laws. We do not " discriminate on the basis of race, color, national origin, age, disability, sex, sexual orientation, or gender identity.            Thank you!     Thank you for choosing [facility]  for your care. Our goal is always to provide you with excellent care. Hearing back from our patients is one way we can continue to improve our services. Please take a few minutes to complete the written survey that you may receive in the mail after your visit with us. Thank you!             Your Updated Medication List - Protect others around you: Learn how to safely use, store and throw away your medicines at www.disposemymeds.org.          This list is accurate as of 8/21/18  9:56 AM.  Always use your most recent med list.                   Brand Name Dispense Instructions for use Diagnosis    citalopram 20 MG tablet    celeXA    90 tablet    Take 1 tablet (20 mg) by mouth daily    Mild depression (H)       D 5000 5000 units Tabs   Generic drug:  Cholecalciferol      Take 5,000 Units by mouth daily        ELINEST 0.3-30 MG-MCG per tablet   Generic drug:  norgestrel-ethinyl estradiol     84 tablet    TAKE 1 TABLET BY MOUTH ONCE DAILY.    Menorrhagia with regular cycle       MULTI-VITAMINS Tabs      Take 1 tablet by mouth daily        order for DME      SAD light.  Diagnosis:  F33.9, F41.8.  Length of need:  99.

## 2018-08-21 NOTE — LETTER
August 21, 2018      Christy Bailey  [address]   [place] [address]        To Whom It May Concern,      I would request that you consider letting Christy have an emotional support animal.  She has suffered from anxiety and depression in the past couple of years and gets great relief of her anxiety with exposure to animals, particularly cats.  Thank you for your consideration.      Sincerely,        Charley Gonzales MD

## 2018-08-21 NOTE — NURSING NOTE
"Chief Complaint   Patient presents with     Recheck Medication     medication management      Letter Request     emtional support dog        Initial /60 (BP Location: Right arm, Patient Position: Sitting, Cuff Size: Adult Regular)  Pulse 76  Ht 5' 4.5\" (1.638 m)  Wt 111 lb (50.3 kg)  LMP 07/31/2018  Breastfeeding? No  BMI 18.76 kg/m2 Estimated body mass index is 18.76 kg/(m^2) as calculated from the following:    Height as of this encounter: 5' 4.5\" (1.638 m).    Weight as of this encounter: 111 lb (50.3 kg).  Medication Reconciliation: complete    Lakeisha Posada LPN  "

## 2018-08-21 NOTE — PROGRESS NOTES
"  SUBJECTIVE:   Nursing Notes:   Lakeisha Posada LPN  8/21/2018  9:43 AM  Unsigned  Chief Complaint   Patient presents with     Recheck Medication     medication management      Letter Request     emtional support dog        Initial /60 (BP Location: Right arm, Patient Position: Sitting, Cuff Size: Adult Regular)  Pulse 76  Ht 5' 4.5\" (1.638 m)  Wt 111 lb (50.3 kg)  LMP 07/31/2018  Breastfeeding? No  BMI 18.76 kg/m2 Estimated body mass index is 18.76 kg/(m^2) as calculated from the following:    Height as of this encounter: 5' 4.5\" (1.638 m).    Weight as of this encounter: 111 lb (50.3 kg).  Medication Reconciliation: complete    Lakeisha Posada LPN    Christy Bailey is a 18 year old female who presents to clinic today for follow up.  She will be going back to school shortly.  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  She feels that her depression and anxiety and currently well controlled on her current dose of celexa.  She would like a letter of support to try to be allowed to have an emotional support animal.  She has noticed that when she is able to be around cats that she feels much more relaxed and happy.      HPI    I personally reviewed medications/allergies/history listed below:    Patient Active Problem List    Diagnosis Date Noted     Anxiety and depression 08/21/2018     Priority: Medium     Murmur 02/22/2018     Priority: Medium     Overview:   Still's murmur.       Iron deficiency 08/07/2017     Priority: Medium     Menorrhagia with regular cycle 05/22/2017     Priority: Medium     Contact dermatitis and eczema 03/30/2012     Priority: Medium     History reviewed. No pertinent past medical history.   History reviewed. No pertinent surgical history.  Family History   Problem Relation Age of Onset     Other - See Comments Mother      GI Disease,ulcerative colitis     Allergy (Severe) Mother      Allergies,seasonal     Asthma Mother      Asthma " "    Family History Negative Father      Good Health     Other - See Comments Paternal Grandfather      sarcoidosis     Ovarian Cancer Paternal Grandmother 52     Cancer-ovarian, of ovarian cancer. diabetes on her side of family     Other - See Comments Brother      complicated  course.  See chart for details.     Family History Negative Sister      Good Health     Social History   Substance Use Topics     Smoking status: Never Smoker     Smokeless tobacco: Never Used     Alcohol use No     Social History     Social History Narrative    She lives with mother (Diana), father (Adonay), one sister, and one brother.  She is the oldest child.      Mom Diana.      Dad Adonay.      Sister Temo (Nina).  Date of birth:  00.      Brother Maynor.  Date of birth:  02.  Attending [employer], plans to pursue Engineering degree.     Current Outpatient Prescriptions   Medication Sig Dispense Refill     order for DME SAD light.  Diagnosis:  F33.9, F41.8.  Length of need:  99.       Cholecalciferol (D 5000) 5000 UNITS TABS Take 5,000 Units by mouth daily       citalopram (CELEXA) 20 MG tablet Take 1 tablet (20 mg) by mouth daily 90 tablet 3     ELINEST 0.3-30 MG-MCG per tablet TAKE 1 TABLET BY MOUTH ONCE DAILY. 84 tablet 2     Multiple Vitamin (MULTI-VITAMINS) TABS Take 1 tablet by mouth daily       [DISCONTINUED] citalopram (CELEXA) 20 MG tablet Take 0.5 tablets by mouth daily       No Known Allergies    Review of Systems   Constitutional: Negative for fatigue and unexpected weight change.   Respiratory: Negative for cough.    Psychiatric/Behavioral: Negative for mood changes. The patient is not nervous/anxious.         OBJECTIVE:     /60 (BP Location: Right arm, Patient Position: Sitting, Cuff Size: Adult Regular)  Pulse 76  Ht 5' 4.5\" (1.638 m)  Wt 111 lb (50.3 kg)  LMP 2018  Breastfeeding? No  BMI 18.76 kg/m2  Body mass index is 18.76 kg/(m^2).  Physical Exam "   Constitutional: She appears well-developed.   HENT:   Head: Normocephalic.   Eyes: Pupils are equal, round, and reactive to light.   Neck: Normal range of motion. Neck supple. No thyromegaly present.   Cardiovascular: Normal rate, regular rhythm and normal heart sounds.    No murmur heard.  Pulmonary/Chest: Effort normal and breath sounds normal. No respiratory distress. She has no wheezes. She has no rales.   Lymphadenopathy:     She has no cervical adenopathy.   Psychiatric: She has a normal mood and affect.         PHQ-9 SCORE 12/21/2017 1/11/2018 8/21/2018   Total Score 16 9 5       TOMMY-7 SCORE 5/22/2017 6/26/2017 8/21/2018   Total Score 4 1 3       I personally reviewed results withpatient as listed below:   Diagnostic Test Results:  none     ASSESSMENT/PLAN:       ICD-10-CM    1. Anxiety and depression F41.8 citalopram (CELEXA) 20 MG tablet       1.  Refilled celexa 20 mg by mouth daily.  She is doing well.  Letter for emotional support animal given.  Follow up as needed.    Charley Gonzales MD  [facility]

## 2018-08-22 ASSESSMENT — PATIENT HEALTH QUESTIONNAIRE - PHQ9: SUM OF ALL RESPONSES TO PHQ QUESTIONS 1-9: 5

## 2018-08-22 ASSESSMENT — ANXIETY QUESTIONNAIRES: GAD7 TOTAL SCORE: 3

## 2018-11-21 ENCOUNTER — OFFICE VISIT (OUTPATIENT)
Dept: FAMILY MEDICINE | Facility: OTHER | Age: 18
End: 2018-11-21
Attending: FAMILY MEDICINE
Payer: COMMERCIAL

## 2018-11-21 VITALS
DIASTOLIC BLOOD PRESSURE: 70 MMHG | TEMPERATURE: 98.4 F | SYSTOLIC BLOOD PRESSURE: 106 MMHG | BODY MASS INDEX: 18.59 KG/M2 | WEIGHT: 110 LBS | HEART RATE: 72 BPM

## 2018-11-21 DIAGNOSIS — F41.9 ANXIETY AND DEPRESSION: Primary | ICD-10-CM

## 2018-11-21 DIAGNOSIS — L65.9 HAIR LOSS: ICD-10-CM

## 2018-11-21 DIAGNOSIS — F32.A ANXIETY AND DEPRESSION: Primary | ICD-10-CM

## 2018-11-21 DIAGNOSIS — E61.1 IRON DEFICIENCY: ICD-10-CM

## 2018-11-21 LAB
FERRITIN SERPL-MCNC: 36 NG/ML (ref 23.9–336.2)
TSH SERPL DL<=0.05 MIU/L-ACNC: 2.84 IU/ML (ref 0.34–5.6)

## 2018-11-21 PROCEDURE — 82728 ASSAY OF FERRITIN: CPT | Performed by: FAMILY MEDICINE

## 2018-11-21 PROCEDURE — 84443 ASSAY THYROID STIM HORMONE: CPT | Performed by: FAMILY MEDICINE

## 2018-11-21 PROCEDURE — 99214 OFFICE O/P EST MOD 30 MIN: CPT | Performed by: FAMILY MEDICINE

## 2018-11-21 PROCEDURE — 36415 COLL VENOUS BLD VENIPUNCTURE: CPT | Performed by: FAMILY MEDICINE

## 2018-11-21 RX ORDER — CITALOPRAM HYDROBROMIDE 40 MG/1
40 TABLET ORAL DAILY
Qty: 30 TABLET | Refills: 3 | Status: SHIPPED | OUTPATIENT
Start: 2018-11-21 | End: 2019-01-07

## 2018-11-21 ASSESSMENT — ANXIETY QUESTIONNAIRES
7. FEELING AFRAID AS IF SOMETHING AWFUL MIGHT HAPPEN: SEVERAL DAYS
IF YOU CHECKED OFF ANY PROBLEMS ON THIS QUESTIONNAIRE, HOW DIFFICULT HAVE THESE PROBLEMS MADE IT FOR YOU TO DO YOUR WORK, TAKE CARE OF THINGS AT HOME, OR GET ALONG WITH OTHER PEOPLE: SOMEWHAT DIFFICULT
2. NOT BEING ABLE TO STOP OR CONTROL WORRYING: SEVERAL DAYS
1. FEELING NERVOUS, ANXIOUS, OR ON EDGE: MORE THAN HALF THE DAYS
GAD7 TOTAL SCORE: 9
5. BEING SO RESTLESS THAT IT IS HARD TO SIT STILL: NEARLY EVERY DAY
3. WORRYING TOO MUCH ABOUT DIFFERENT THINGS: SEVERAL DAYS
6. BECOMING EASILY ANNOYED OR IRRITABLE: SEVERAL DAYS

## 2018-11-21 ASSESSMENT — PAIN SCALES - GENERAL: PAINLEVEL: NO PAIN (0)

## 2018-11-21 ASSESSMENT — PATIENT HEALTH QUESTIONNAIRE - PHQ9
SUM OF ALL RESPONSES TO PHQ QUESTIONS 1-9: 16
5. POOR APPETITE OR OVEREATING: NOT AT ALL

## 2018-11-21 NOTE — NURSING NOTE
"Patient presents to the clinic for a medication check.  Kasia Fernandez LPN 11/21/2018   3:00 PM    Chief Complaint   Patient presents with     Recheck Medication       Initial /70 (BP Location: Right arm, Patient Position: Sitting, Cuff Size: Adult Regular)  Pulse 72  Temp 98.4  F (36.9  C) (Tympanic)  Wt 110 lb (49.9 kg)  Breastfeeding? No  BMI 18.59 kg/m2 Estimated body mass index is 18.59 kg/(m^2) as calculated from the following:    Height as of 8/21/18: 5' 4.5\" (1.638 m).    Weight as of this encounter: 110 lb (49.9 kg).  Medication Reconciliation: complete    Kasia Fernandez    "

## 2018-11-21 NOTE — LETTER
November 23, 2018      [address]y [address]     [place] [address]        Dear ,    We are writing to inform you of your test results.    Your test results fall within the expected range(s) or remain unchanged from previous results.  No evidence of low iron.  Please continue with current treatment plan.    Resulted Orders   Ferritin   Result Value Ref Range    Ferritin 36 23.9 - 336.2 ng/mL   TSH   Result Value Ref Range    Thyrotropin 2.84 0.34 - 5.60 IU/mL       If you have any questions or concerns, please call the clinic at the number listed above.       Sincerely,        True Dexter MD

## 2018-11-21 NOTE — MR AVS SNAPSHOT
After Visit Summary   11/21/2018    Christy Bailey    MRN: [id number]           Patient Information     Date Of Birth          2000        Visit Information        Provider Department      11/21/2018 3:00 PM True Dexter MD [facility]        Today's Diagnoses     Anxiety and depression    -  1    Iron deficiency        Hair loss           Follow-ups after your visit        Additional Services     MENTAL HEALTH REFERRAL  - Adult; Assessments and Testing; ADHD; Other: Not Listed - Enter Referral Details in Scheduling Comments Below                 Who to contact     If you have questions or need follow up information about today's clinic visit or your schedule please contact [facility] directly at [phone].  Normal or non-critical lab and imaging results will be communicated to you by MyChart, letter or phone within 4 business days after the clinic has received the results. If you do not hear from us within 7 days, please contact the clinic through MyChart or phone. If you have a critical or abnormal lab result, we will notify you by phone as soon as possible.  Submit refill requests through [employer] or call your pharmacy and they will forward the refill request to us. Please allow 3 business days for your refill to be completed.          Additional Information About Your Visit        Care EveryWhere ID     This is your Care EveryWhere ID. This could be used by other organizations to access your [place] medical records  [id number]        Your Vitals Were     Pulse Temperature Breastfeeding? BMI (Body Mass Index)          72 98.4  F (36.9  C) (Tympanic) No 18.59 kg/m2         Blood Pressure from Last 3 Encounters:   11/21/18 106/70   08/21/18 100/60   01/11/18 110/70    Weight from Last 3 Encounters:   11/21/18 110 lb (49.9 kg)   08/21/18 111 lb (50.3 kg)   01/11/18 110 lb (49.9 kg) (16 %)*     * Growth percentiles are based on CDC 2-20  Years data.              We Performed the Following     Ferritin     MENTAL HEALTH REFERRAL  - Adult; Assessments and Testing; ADHD; Other: Not Listed - Enter Referral Details in Scheduling Comments Below     TSH          Today's Medication Changes          These changes are accurate as of 11/21/18 11:59 PM.  If you have any questions, ask your nurse or doctor.               These medicines have changed or have updated prescriptions.        Dose/Directions    citalopram 40 MG tablet   Commonly known as:  celeXA   This may have changed:    - medication strength  - how much to take   Used for:  Anxiety and depression   Changed by:  True Dexter MD        Dose:  40 mg   Take 1 tablet (40 mg) by mouth daily   Quantity:  30 tablet   Refills:  3            Where to get your medicines      These medications were sent to [id number] ([place] - [place], [place] - [address]  [address], [facility] [address]     Phone:  [phone]     citalopram 40 MG tablet                Primary Care Provider Office Phone # Fax #    Charley Marguerite Gonzales [phone]       [address] [facility] [address]        Equal Access to Services     [facility]: Hadii aad ku hadasho Soomaali, waaxda luqadaha, qaybta kaalmada adejose miguelyada, cyrus maxwell . So [facility] [phone].    ATENCIÓN: Si habla español, tiene a franco disposición servicios gratuitos de asistencia lingüística. [facility] [phone].    We comply with applicable federal civil rights laws and [place] laws. We do not discriminate on the basis of race, color, national origin, age, disability, sex, sexual orientation, or gender identity.            Thank you!     Thank you for choosing [facility]  for your care. Our goal is always to provide you with excellent care. Hearing back from our patients is one way we can continue to improve our services. Please take a few  minutes to complete the written survey that you may receive in the mail after your visit with us. Thank you!             Your Updated Medication List - Protect others around you: Learn how to safely use, store and throw away your medicines at www.disposemymeds.org.          This list is accurate as of 11/21/18 11:59 PM.  Always use your most recent med list.                   Brand Name Dispense Instructions for use Diagnosis    citalopram 40 MG tablet    celeXA    30 tablet    Take 1 tablet (40 mg) by mouth daily    Anxiety and depression       D 5000 5000 units Tabs   Generic drug:  Cholecalciferol      Take 4,000 Units by mouth daily        ELINEST 0.3-30 MG-MCG per tablet   Generic drug:  norgestrel-ethinyl estradiol     84 tablet    TAKE 1 TABLET BY MOUTH ONCE DAILY.    Menorrhagia with regular cycle       MULTI-VITAMINS Tabs      Take 1 tablet by mouth daily        order for DME      SAD light.  Diagnosis:  F33.9, F41.8.  Length of need:  99.

## 2018-11-22 ASSESSMENT — ANXIETY QUESTIONNAIRES: GAD7 TOTAL SCORE: 9

## 2018-11-23 NOTE — PROGRESS NOTES
SUBJECTIVE:   Christy Bailey is a 18 year old female who presents to clinic today for the following health issues: Medication check.  Also reports tired.  Hair loss.  History of iron deficiency    HPI Comments: Patient arrives here for medication check.  She also reports that she has a history of iron deficiency.  Has been tired quite a bit.  She has been losing some hair.  In a generalized fashion.  She finds that it is much worse on her calm and in the shower.  No fevers or chills.  No hot or cold intolerances..  Patient reports that she has a history of depression and anxiety.  This interested in getting a mental health referral.  People have been concerned about whether she had ADHD or not.  Patient denies any recent illnesses no medical fevers or chills.  She has been under a lot of stress lately but this is been chronic lasting for months.        Patient Active Problem List    Diagnosis Date Noted     Hair loss 11/21/2018     Priority: Medium     Anxiety and depression 08/21/2018     Priority: Medium     Murmur 02/22/2018     Priority: Medium     Overview:   Still's murmur.       Iron deficiency 08/07/2017     Priority: Medium     Menorrhagia with regular cycle 05/22/2017     Priority: Medium     Contact dermatitis and eczema 03/30/2012     Priority: Medium     History reviewed. No pertinent past medical history.   History reviewed. No pertinent surgical history.  Social History     Social History Narrative    She lives with mother (Diana), father (Adonay), one sister, and one brother.  She is the oldest child.      Mom Diana.      Dad Adonay.      Sister Temo (Nina).  Date of birth:  05/04/00.      Brother Maynor.  Date of birth:  04/12/02.      Attending [employer]  [place] [employer].  Pursuing a degree in Asian studies with a focus on [place] culture.     Current Outpatient Prescriptions   Medication Sig Dispense Refill     Cholecalciferol (D 5000) 5000 UNITS TABS Take 4,000 Units by mouth daily         citalopram (CELEXA) 40 MG tablet Take 1 tablet (40 mg) by mouth daily 30 tablet 3     ELINEST 0.3-30 MG-MCG per tablet TAKE 1 TABLET BY MOUTH ONCE DAILY. 84 tablet 2     Multiple Vitamin (MULTI-VITAMINS) TABS Take 1 tablet by mouth daily       order for DME SAD light.  Diagnosis:  F33.9, F41.8.  Length of need:  99.       No Known Allergies    Review of Systems     OBJECTIVE:     /70 (BP Location: Right arm, Patient Position: Sitting, Cuff Size: Adult Regular)  Pulse 72  Temp 98.4  F (36.9  C) (Tympanic)  Wt 110 lb (49.9 kg)  Breastfeeding? No  BMI 18.59 kg/m2  Body mass index is 18.59 kg/(m^2).  Physical Exam   Constitutional: She appears well-developed and well-nourished.   HENT:   Head: Normocephalic and atraumatic.   Eyes: Pupils are equal, round, and reactive to light.   Neck: Normal range of motion.   Cardiovascular: Normal rate, regular rhythm and normal heart sounds.    No murmur heard.  Pulmonary/Chest: Effort normal and breath sounds normal. No respiratory distress. She has no wheezes.   Abdominal: Soft.   Musculoskeletal: Normal range of motion.   Neurological: She is alert.   Hyperreflexia bilateral no clonus   Skin: Skin is warm.   No areas of alopecia   Psychiatric:   Affect is flat       Diagnostic Test Results:  Results for orders placed or performed in visit on 11/21/18   Ferritin   Result Value Ref Range    Ferritin 36 23.9 - 336.2 ng/mL   TSH   Result Value Ref Range    Thyrotropin 2.84 0.34 - 5.60 IU/mL       ASSESSMENT/PLAN:           ICD-10-CM    1. Anxiety and depression F41.9 citalopram (CELEXA) 40 MG tablet    F32.9 MENTAL HEALTH REFERRAL  - Adult; Assessments and Testing; ADHD; Other: Not Listed - Enter Referral Details in Scheduling Comments Below   2. Iron deficiency E61.1 Ferritin     Ferritin   3. Hair loss L65.9 TSH     TSH     Normal TSH and ferritin.  Patient will be informed of the results.  We will increase her Celexa 40 mg/day from 20.  Mental health referral for  possible ADHD.    True Dexter MD  [facility] AND [facility]

## 2018-12-24 DIAGNOSIS — N92.0 MENORRHAGIA WITH REGULAR CYCLE: ICD-10-CM

## 2018-12-27 RX ORDER — NORGESTREL AND ETHINYL ESTRADIOL 0.3-0.03MG
KIT ORAL
Qty: 84 TABLET | Refills: 2 | Status: SHIPPED | OUTPATIENT
Start: 2018-12-27 | End: 2019-01-07

## 2018-12-27 NOTE — TELEPHONE ENCOUNTER
"Requested Prescriptions   Pending Prescriptions Disp Refills     ELINEST 0.3-30 MG-MCG tablet [Pharmacy Med Name: ELINEST 28-DAY TABLET] 84 tablet 2     Sig: TAKE 1 TABLET BY MOUTH ONCE DAILY.    Contraceptives Protocol Passed - 12/24/2018  1:00 AM       Passed - Patient is not a current smoker if age is 35 or older       Passed - Recent (12 mo) or future (30 days) visit within the authorizing provider's specialty    Patient had office visit in the last 12 months or has a visit in the next 30 days with authorizing provider or within the authorizing provider's specialty.  See \"Patient Info\" tab in inbasket, or \"Choose Columns\" in Meds & Orders section of the refill encounter.             Passed - No active pregnancy on record       Passed - No positive pregnancy test in past 12 months        LOV-11/21/2018    Prescription refilled per RN Medication [facility]ill[facility].................... Tiffany Cui ....................  12/27/2018   3:36 PM        "

## 2019-01-07 ENCOUNTER — OFFICE VISIT (OUTPATIENT)
Dept: FAMILY MEDICINE | Facility: OTHER | Age: 19
End: 2019-01-07
Attending: FAMILY MEDICINE
Payer: COMMERCIAL

## 2019-01-07 VITALS
TEMPERATURE: 98.8 F | WEIGHT: 108 LBS | HEART RATE: 88 BPM | DIASTOLIC BLOOD PRESSURE: 60 MMHG | RESPIRATION RATE: 20 BRPM | HEIGHT: 65 IN | BODY MASS INDEX: 17.99 KG/M2 | SYSTOLIC BLOOD PRESSURE: 100 MMHG

## 2019-01-07 DIAGNOSIS — F41.9 ANXIETY AND DEPRESSION: ICD-10-CM

## 2019-01-07 DIAGNOSIS — N92.0 MENORRHAGIA WITH REGULAR CYCLE: ICD-10-CM

## 2019-01-07 DIAGNOSIS — F32.A ANXIETY AND DEPRESSION: ICD-10-CM

## 2019-01-07 PROCEDURE — 99213 OFFICE O/P EST LOW 20 MIN: CPT | Performed by: FAMILY MEDICINE

## 2019-01-07 RX ORDER — CITALOPRAM HYDROBROMIDE 40 MG/1
40 TABLET ORAL DAILY
Qty: 90 TABLET | Refills: 3 | Status: SHIPPED | OUTPATIENT
Start: 2019-01-07 | End: 2019-12-27

## 2019-01-07 ASSESSMENT — ANXIETY QUESTIONNAIRES
GAD7 TOTAL SCORE: 7
1. FEELING NERVOUS, ANXIOUS, OR ON EDGE: SEVERAL DAYS
3. WORRYING TOO MUCH ABOUT DIFFERENT THINGS: SEVERAL DAYS
IF YOU CHECKED OFF ANY PROBLEMS ON THIS QUESTIONNAIRE, HOW DIFFICULT HAVE THESE PROBLEMS MADE IT FOR YOU TO DO YOUR WORK, TAKE CARE OF THINGS AT HOME, OR GET ALONG WITH OTHER PEOPLE: SOMEWHAT DIFFICULT
2. NOT BEING ABLE TO STOP OR CONTROL WORRYING: NOT AT ALL
5. BEING SO RESTLESS THAT IT IS HARD TO SIT STILL: NEARLY EVERY DAY
6. BECOMING EASILY ANNOYED OR IRRITABLE: SEVERAL DAYS
7. FEELING AFRAID AS IF SOMETHING AWFUL MIGHT HAPPEN: NOT AT ALL

## 2019-01-07 ASSESSMENT — ENCOUNTER SYMPTOMS
FATIGUE: 0
DYSPHORIC MOOD: 0
FEVER: 0
NERVOUS/ANXIOUS: 0
AGITATION: 0
SLEEP DISTURBANCE: 1

## 2019-01-07 ASSESSMENT — PATIENT HEALTH QUESTIONNAIRE - PHQ9
SUM OF ALL RESPONSES TO PHQ QUESTIONS 1-9: 10
5. POOR APPETITE OR OVEREATING: SEVERAL DAYS

## 2019-01-07 ASSESSMENT — PAIN SCALES - GENERAL: PAINLEVEL: NO PAIN (0)

## 2019-01-07 ASSESSMENT — MIFFLIN-ST. JEOR: SCORE: 1252.82

## 2019-01-07 NOTE — NURSING NOTE
"Chief Complaint   Patient presents with     Recheck Medication     also sleep issue    Patient says she gets insomnia at times with college .     Initial /60 (BP Location: Right arm, Patient Position: Sitting, Cuff Size: Adult Small)   Pulse 88   Temp 98.8  F (37.1  C) (Tympanic)   Resp 20   Ht 1.638 m (5' 4.5\")   Wt 49 kg (108 lb)   BMI 18.25 kg/m   Estimated body mass index is 18.25 kg/m  as calculated from the following:    Height as of this encounter: 1.638 m (5' 4.5\").    Weight as of this encounter: 49 kg (108 lb).  Medication Reconciliation: complete    Lakeisha Posada LPN  "

## 2019-01-07 NOTE — PROGRESS NOTES
"  SUBJECTIVE:   Nursing Notes:   Lakeisha Posada LPN  1/7/2019 11:33 AM  Sign at exiting of workspace  Chief Complaint   Patient presents with     Recheck Medication     also sleep issue    Patient says she gets insomnia at times with college .     Initial /60 (BP Location: Right arm, Patient Position: Sitting, Cuff Size: Adult Small)   Pulse 88   Temp 98.8  F (37.1  C) (Tympanic)   Resp 20   Ht 1.638 m (5' 4.5\")   Wt 49 kg (108 lb)   BMI 18.25 kg/m    Estimated body mass index is 18.25 kg/m  as calculated from the following:    Height as of this encounter: 1.638 m (5' 4.5\").    Weight as of this encounter: 49 kg (108 lb).  Medication Reconciliation: complete    Lakeisha Posada LPN    Christy Bailey is a 18 year old female who presents to clinic today for follow-up.    She has been using birth control pills to help manage heavy periods.  This is been very effective for her.  She would like to have a refill today.    She also wanted to follow-up on her anxiety and depression.  She is currently attending [employer] and will be restarting for the spring semester in a week.  She would like to have a letter for an emotional support animal.  She states that anytime she has been around a cat she has had significant improvement in her anxiety.  She also feels that if she had an animal, it would help to make her more motivated to care for it, which would likely help her depression overall.  She has had some trouble sleeping at times when she is more stress.  She is wondering if there is anything that she could take that might help this.    HPI    I personally reviewed medications/allergies/history listed below:    Patient Active Problem List    Diagnosis Date Noted     Hair loss 11/21/2018     Priority: Medium     Anxiety and depression 08/21/2018     Priority: Medium     Murmur 02/22/2018     Priority: Medium     Overview:   Still's murmur.       Iron deficiency 08/07/2017     Priority: Medium "     Menorrhagia with regular cycle 2017     Priority: Medium     Contact dermatitis and eczema 2012     Priority: Medium     History reviewed. No pertinent past medical history.   History reviewed. No pertinent surgical history.  Family History   Problem Relation Age of Onset     Other - See Comments Mother         GI Disease,ulcerative colitis     Allergy (Severe) Mother         Allergies,seasonal     Asthma Mother         Asthma     Family History Negative Father         Good Health     Other - See Comments Paternal Grandfather         sarcoidosis     Ovarian Cancer Paternal Grandmother 52        Cancer-ovarian, of ovarian cancer. diabetes on her side of family     Other - See Comments Brother         complicated  course.  See chart for details.     Family History Negative Sister         Good Health     Social History     Tobacco Use     Smoking status: Never Smoker     Smokeless tobacco: Never Used   Substance Use Topics     Alcohol use: No     Social History     Social History Narrative    She lives with mother (Diana), father (Adonay), one sister, and one brother.  She is the oldest child.      Mom Diana.      Dad Adonay.      Sister Temo (Nina).  Date of birth:  00.      Brother Maynor.  Date of birth:  02.      Attending [employer].  Pursuing a degree in Asian studies with a focus on [place] culture.     Current Outpatient Medications   Medication Sig Dispense Refill     Cholecalciferol (D 5000) 5000 UNITS TABS Take 4,000 Units by mouth daily        citalopram (CELEXA) 40 MG tablet Take 1 tablet (40 mg) by mouth daily 90 tablet 3     Multiple Vitamin (MULTI-VITAMINS) TABS Take 1 tablet by mouth daily       norgestrel-ethinyl estradiol (ELINEST) 0.3-30 MG-MCG tablet Take 1 tablet by mouth daily 84 tablet 3     order for DME SAD light.  Diagnosis:  F33.9, F41.8.  Length of need:  99.       No Known Allergies    Review of Systems   Constitutional:  "Negative for fatigue and fever.   Psychiatric/Behavioral: Positive for sleep disturbance. Negative for agitation, behavioral problems, dysphoric mood, mood changes and self-injury. The patient is not nervous/anxious.         OBJECTIVE:     /60 (BP Location: Right arm, Patient Position: Sitting, Cuff Size: Adult Small)   Pulse 88   Temp 98.8  F (37.1  C) (Tympanic)   Resp 20   Ht 1.638 m (5' 4.5\")   Wt 49 kg (108 lb)   BMI 18.25 kg/m    Body mass index is 18.25 kg/m .  Physical Exam   Constitutional: She appears well-developed and well-nourished.   HENT:   Head: Normocephalic.   Eyes: Pupils are equal, round, and reactive to light.   Neck: Normal range of motion. Neck supple. No thyromegaly present.   Cardiovascular: Normal rate, regular rhythm and normal heart sounds.   No murmur heard.  Pulmonary/Chest: Effort normal and breath sounds normal.   Lymphadenopathy:     She has no cervical adenopathy.         PHQ-9 SCORE 8/21/2018 11/21/2018 1/7/2019   PHQ-9 Total Score 5 16 10       TOMMY-7 SCORE 8/21/2018 11/21/2018 1/7/2019   Total Score 3 9 7       I personally reviewed results withpatient as listed below:   Diagnostic Test Results:  none     ASSESSMENT/PLAN:       ICD-10-CM    1. Menorrhagia with regular cycle N92.0 norgestrel-ethinyl estradiol (ELINEST) 0.3-30 MG-MCG tablet   2. Anxiety and depression F41.9 citalopram (CELEXA) 40 MG tablet    F32.9        1.  Oral birth control pill was refilled for her today as above.  This is working well for her.  2.  Stable on Celexa 40 mg daily.  She does not want to make any changes.  Recommended using either over-the-counter L atonement 5 mg p.o. nightly or Benadryl 25-50 mg p.o. nightly as needed for sleep.  She feels that she would need to use this fairly infrequently.    Charley Gonzales MD  [facility]    "

## 2019-01-09 ASSESSMENT — ANXIETY QUESTIONNAIRES: GAD7 TOTAL SCORE: 7

## 2019-07-12 ENCOUNTER — OFFICE VISIT (OUTPATIENT)
Dept: FAMILY MEDICINE | Facility: OTHER | Age: 19
End: 2019-07-12
Attending: NURSE PRACTITIONER
Payer: COMMERCIAL

## 2019-07-12 VITALS
BODY MASS INDEX: 17.29 KG/M2 | DIASTOLIC BLOOD PRESSURE: 70 MMHG | TEMPERATURE: 97.9 F | SYSTOLIC BLOOD PRESSURE: 108 MMHG | HEART RATE: 72 BPM | RESPIRATION RATE: 16 BRPM | WEIGHT: 107.6 LBS | HEIGHT: 66 IN

## 2019-07-12 DIAGNOSIS — L25.9 CONTACT DERMATITIS, UNSPECIFIED CONTACT DERMATITIS TYPE, UNSPECIFIED TRIGGER: Primary | Chronic | ICD-10-CM

## 2019-07-12 PROCEDURE — 99213 OFFICE O/P EST LOW 20 MIN: CPT | Performed by: NURSE PRACTITIONER

## 2019-07-12 RX ORDER — TRIAMCINOLONE ACETONIDE 1 MG/ML
LOTION TOPICAL 3 TIMES DAILY
Qty: 30 ML | Refills: 0 | Status: SHIPPED | OUTPATIENT
Start: 2019-07-12 | End: 2019-08-23

## 2019-07-12 ASSESSMENT — ENCOUNTER SYMPTOMS
CONSTITUTIONAL NEGATIVE: 1
EYES NEGATIVE: 1
MUSCULOSKELETAL NEGATIVE: 1
RESPIRATORY NEGATIVE: 1
ADENOPATHY: 0
WEAKNESS: 0

## 2019-07-12 ASSESSMENT — MIFFLIN-ST. JEOR: SCORE: 1266.88

## 2019-07-12 NOTE — PROGRESS NOTES
SUBJECTIVE:   Christy Bailey is a 18 year old female who presents to clinic today for the following health issues:    HPI  Has an intermittent rash on her hands for the past 7 months. Has applied calamine and taken antihistamines which helped with the itch. Will get blisters and open up. Not painful, only if she puts her hands in hot water. No new soaps, lotions, foods. No pets. No others at home have it.     Patient Active Problem List    Diagnosis Date Noted     Hair loss 2018     Priority: Medium     Anxiety and depression 2018     Priority: Medium     Murmur 2018     Priority: Medium     Overview:   Still's murmur.       Iron deficiency 2017     Priority: Medium     Menorrhagia with regular cycle 2017     Priority: Medium     Contact dermatitis and eczema 2012     Priority: Medium     History reviewed. No pertinent past medical history.   History reviewed. No pertinent surgical history.  Family History   Problem Relation Age of Onset     Other - See Comments Mother         GI Disease,ulcerative colitis     Allergy (Severe) Mother         Allergies,seasonal     Asthma Mother         Asthma     Family History Negative Father         Good Health     Other - See Comments Paternal Grandfather         sarcoidosis     Ovarian Cancer Paternal Grandmother 52        Cancer-ovarian, of ovarian cancer. diabetes on her side of family     Other - See Comments Brother         complicated  course.  See chart for details.     Family History Negative Sister         Good Health     Social History     Tobacco Use     Smoking status: Never Smoker     Smokeless tobacco: Never Used   Substance Use Topics     Alcohol use: No     Social History     Social History Narrative    She lives with mother (Diana), father (Adonay), one sister, and one brother.  She is the oldest child.      Mom Diana.      Dad Adonay.      Sister Temo (Nina).  Date of birth:  00.      Brother  "Maynor.  Date of birth:  04/12/02.      Attending [employer].  Pursuing a degree in Asian studies with a focus on [place]iny[place] culture.     Current Outpatient Medications   Medication Sig Dispense Refill     triamcinolone (KENALOG) 0.1 % external lotion Apply topically 3 times daily for 7 days 30 mL 0     Cholecalciferol (D 5000) 5000 UNITS TABS Take 4,000 Units by mouth daily        citalopram (CELEXA) 40 MG tablet Take 1 tablet (40 mg) by mouth daily 90 tablet 3     Multiple Vitamin (MULTI-VITAMINS) TABS Take 1 tablet by mouth daily       norgestrel-ethinyl estradiol (ELINEST) 0.3-30 MG-MCG tablet Take 1 tablet by mouth daily 84 tablet 3     order for DME SAD light.  Diagnosis:  F33.9, F41.8.  Length of need:  99.       No Known Allergies    Review of Systems   Constitutional: Negative.    Eyes: Negative.    Respiratory: Negative.    Musculoskeletal: Negative.    Skin: Positive for rash.   Neurological: Negative for weakness.   Hematological: Negative for adenopathy.        OBJECTIVE:     /70 (BP Location: Left arm, Patient Position: Sitting, Cuff Size: Adult Regular)   Pulse 72   Temp 97.9  F (36.6  C) (Tympanic)   Resp 16   Ht 1.664 m (5' 5.5\")   Wt 48.8 kg (107 lb 9.6 oz)   Breastfeeding? No   BMI 17.63 kg/m    Body mass index is 17.63 kg/m .  Physical Exam   Constitutional: She is oriented to person, place, and time. She appears well-developed and well-nourished. No distress.   HENT:   Head: Normocephalic and atraumatic.   Eyes: Conjunctivae are normal. No scleral icterus.   Neck: Normal range of motion.   Cardiovascular: Normal rate.   Pulmonary/Chest: Effort normal.   Musculoskeletal: Normal range of motion.   Neurological: She is alert and oriented to person, place, and time.   Skin: Skin is warm and dry. Rash ( Faint pink maculopapular rash on bilateral hands, some areas are slightly dry and scaly) noted. She is not diaphoretic.   Psychiatric: She has a normal mood and affect. Her " behavior is normal.   Nursing note and vitals reviewed.      Diagnostic Test Results:  No results found for this or any previous visit (from the past 24 hour(s)).    ASSESSMENT/PLAN:       ICD-10-CM    1. Contact dermatitis, unspecified contact dermatitis type, unspecified trigger L25.9 triamcinolone (KENALOG) 0.1 % external lotion    Started with touching a scarf     PLAN:   She's afebrile, nontoxic appearing, NAD.   Benign appearing rash to hands for 7 months, sounds like she's had this flare in the past and resolved without difficulty.  Advised to keep skin clean, well moisturized and use triamcinolone BID x 1 week.   F/u with PCP if not improving or sx persist. Given Epic educational materials.   I explained my diagnostic considerations and recommendations to pt who voiced understanding and agreement with the treatment plan. All questions were answered. We discussed potential side effects of any prescribed or recommended therapies, as well as expectations for response to treatments.    Disclaimer:  This note consists of words and symbols derived from keyboarding, dictation, or using voice recognition software. As a result, there may be errors in the script that have gone undetected. Please consider this when interpreting information found in this note.      BRITTANI Blanc, NP-C  7/12/2019 at 10:46 AM  [facility]

## 2019-07-12 NOTE — PATIENT INSTRUCTIONS
"Patient Education     Contact Dermatitis  Contact dermatitis is a skin rash caused by something that touches the skin and makes it irritated and inflamed. Your skin may be red, swollen, dry, and may be cracked. Blisters may form and ooze. The rash will itch.  Contact dermatitis can form on the face and neck, backs of hands, forearms, genitals, and lower legs.  People can get contact dermatitis from lots of sources. These include:    Plants such as poison ivy, oak, or sumac    Chemicals in hair dyes and rinses, soaps, solvents, waxes, fingernail polish, and deodorants     Jewelry or watchbands made of nickel  Contact dermatitis is not passed from person to person.  Talk with your healthcare provider about what may have caused the rash. A type of allergy testing called \"patch testing\" may be used to discover what you are allergic to. You will need to avoid the source of your rash in the future to prevent it from coming back.  Treatment is done to relieve itching and prevent the rash from coming back. The rash should go away in a few days to a few weeks.  Home care  Your healthcare provider may prescribe medicine to relieve swelling and itching. Follow all instructions when using these medicines.  General care:    Avoid anything that heats up your skin, such as hot showers or baths, or direct sunlight. This can make itching worse.    Apply cold compresses to soothe your sores to help relieve your symptoms. Do this for 30 minutes 3 to 4 times a day. You can make a cold compress by soaking a cloth in cold water. Squeeze out excess water. You can add colloidal oatmeal to the water to help reduce itching. For severe itching in a small area, apply an ice pack wrapped in a thin towel. Do this for 20 minutes 3 to 4 times a day.    You can also try wet dressings. One way to do this is to wear a wet piece of clothing under a dry one. Wear a damp shirt under a dry shirt if your upper body is affected. This can relieve itching " and prevent you from scratching the affected area.    You can also help relieve large areas of itching by taking a lukewarm bath with colloidal oatmeal added to the water.    Use hydrocortisone cream for redness and irritation, unless another medicine was prescribed. You can also use benzocaine anesthetic cream or spray. Calamine lotion can also relieve mild symptoms.    Use oral diphenhydramine to help reduce itching. You can buy this antihistamine at drug and grocery stores. It can make you sleepy, so use lower doses during the daytime. Or you can use loratadine. This is an antihistamine that will not make you sleepy. Do not use diphenhydramine if you have glaucoma or have trouble urinating due to an enlarged prostate.    If a plant causes your rash, make sure to wash your skin and the clothes you were wearing when you came into contact with the plant. This is to wash away the plant oils that gave you the rash and prevent more or worse symptoms.    Stay away from the substance or object that causes your symptoms. If you can t avoid it, wear gloves or some other type of protection.  Follow-up care  Follow up with your healthcare provider, or as advised.  When to seek medical advice  Call your healthcare provider right away if any of these occur:    Spreading of the rash to other parts of your body    Severe swelling of your face, eyelids, mouth, throat or tongue    Trouble urinating due to swelling in the genital area    Fever of 100.4 F (38 C) or higher    Redness or swelling that gets worse    Pain that gets worse    Foul-smelling fluid leaking from the skin    Yellow-brown crusts on the open blisters  Date Last Reviewed: 9/1/2016 2000-2018 [employer]. [address] [place] [address], [place] [address]. All rights reserved. This information is not intended as a substitute for professional medical care. Always follow your healthcare professional's instructions.

## 2019-07-12 NOTE — NURSING NOTE
Patient presents to the clinic for on and off itchy rash on right hand and left thumb that started around December. She has put on calamine lotion and taken antihistamines for treatment.  Medication Reconciliation: complete    Meghna Orr, CMA

## 2019-08-23 ENCOUNTER — OFFICE VISIT (OUTPATIENT)
Dept: FAMILY MEDICINE | Facility: OTHER | Age: 19
End: 2019-08-23
Attending: NURSE PRACTITIONER
Payer: COMMERCIAL

## 2019-08-23 VITALS
BODY MASS INDEX: 17.44 KG/M2 | HEART RATE: 76 BPM | TEMPERATURE: 98.7 F | SYSTOLIC BLOOD PRESSURE: 102 MMHG | DIASTOLIC BLOOD PRESSURE: 68 MMHG | WEIGHT: 104.7 LBS | RESPIRATION RATE: 18 BRPM | OXYGEN SATURATION: 98 % | HEIGHT: 65 IN

## 2019-08-23 DIAGNOSIS — L30.1 DYSHIDROTIC ECZEMA: Primary | ICD-10-CM

## 2019-08-23 PROCEDURE — 99213 OFFICE O/P EST LOW 20 MIN: CPT | Performed by: NURSE PRACTITIONER

## 2019-08-23 RX ORDER — TRIAMCINOLONE ACETONIDE 5 MG/G
1 OINTMENT TOPICAL 2 TIMES DAILY
Qty: 60 G | Refills: 0 | Status: SHIPPED | OUTPATIENT
Start: 2019-08-23 | End: 2020-03-03

## 2019-08-23 ASSESSMENT — ANXIETY QUESTIONNAIRES
5. BEING SO RESTLESS THAT IT IS HARD TO SIT STILL: MORE THAN HALF THE DAYS
3. WORRYING TOO MUCH ABOUT DIFFERENT THINGS: NOT AT ALL
IF YOU CHECKED OFF ANY PROBLEMS ON THIS QUESTIONNAIRE, HOW DIFFICULT HAVE THESE PROBLEMS MADE IT FOR YOU TO DO YOUR WORK, TAKE CARE OF THINGS AT HOME, OR GET ALONG WITH OTHER PEOPLE: SOMEWHAT DIFFICULT
1. FEELING NERVOUS, ANXIOUS, OR ON EDGE: SEVERAL DAYS
7. FEELING AFRAID AS IF SOMETHING AWFUL MIGHT HAPPEN: NOT AT ALL
2. NOT BEING ABLE TO STOP OR CONTROL WORRYING: NOT AT ALL
GAD7 TOTAL SCORE: 4
6. BECOMING EASILY ANNOYED OR IRRITABLE: NOT AT ALL

## 2019-08-23 ASSESSMENT — MIFFLIN-ST. JEOR: SCORE: 1232.86

## 2019-08-23 ASSESSMENT — PATIENT HEALTH QUESTIONNAIRE - PHQ9
5. POOR APPETITE OR OVEREATING: SEVERAL DAYS
SUM OF ALL RESPONSES TO PHQ QUESTIONS 1-9: 6

## 2019-08-23 ASSESSMENT — PAIN SCALES - GENERAL: PAINLEVEL: NO PAIN (0)

## 2019-08-23 NOTE — PROGRESS NOTES
"HPI:    Christy Bailey is a 19 year old female  who presents to clinic today for worsening rash.    Rash on bilateral hands for about 9 months.  Rash is dry, cracked, fissured, and blisters at times.  Rash is both painful and itchy at times.  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  No gloves used while washing dishes.  The rash does worsen with exposure to water especially hot water.  Denies any new exposures or products.  She denies any fevers.  No joint aches.  No other rashes.  Tried calamine and antihistamines which helps with the itching but does not clear the rash.   She was previously seen on 7/12/2019 and prescribed triamcinolone 0.1%      History reviewed. No pertinent past medical history.  History reviewed. No pertinent surgical history.  Social History     Tobacco Use     Smoking status: Never Smoker     Smokeless tobacco: Never Used   Substance Use Topics     Alcohol use: Yes     Comment: Occassional     Current Outpatient Medications   Medication Sig Dispense Refill     Cholecalciferol (D 5000) 5000 UNITS TABS Take 4,000 Units by mouth daily        citalopram (CELEXA) 40 MG tablet Take 1 tablet (40 mg) by mouth daily 90 tablet 3     Multiple Vitamin (MULTI-VITAMINS) TABS Take 1 tablet by mouth daily       norgestrel-ethinyl estradiol (ELINEST) 0.3-30 MG-MCG tablet Take 1 tablet by mouth daily 84 tablet 3     order for DME SAD light.  Diagnosis:  F33.9, F41.8.  Length of need:  99.       No Known Allergies      Past medical history, past surgical history, current medications and allergies reviewed and accurate to the best of my knowledge.        ROS:  Refer to HPI    /68 (BP Location: Right arm, Patient Position: Sitting, Cuff Size: Adult Regular)   Pulse 76   Temp 98.7  F (37.1  C) (Tympanic)   Resp 18   Ht 1.638 m (5' 4.5\")   Wt 47.5 kg (104 lb 11.2 oz)   LMP 08/19/2019 (Exact Date)   SpO2 98%   Breastfeeding? No   BMI 17.69 kg/m  "     EXAM:  General Appearance: Well appearing adolescent female, appropriate appearance for age. No acute distress  Respiratory:  Normal effort.  No cough appreciated, oxygen saturation 98%  Cardiovascular: CMS intact to bilateral lower extremities  Musculoskeletal:  Equal movement of bilateral upper extremities.  Equal movement of bilateral lower extremities.  Normal gait.    Dermatological: Bilateral hands with very pale erythematous vesicular rash with dry excoriated lichenified scaly fissured plaques most prominent along the thumbs and fifth fingers and and along the lateral dorsal sides of the hands.  Psychological: normal affect, alert and pleasant          ASSESSMENT/PLAN:  1. Dyshidrotic eczema    - triamcinolone (KENALOG) 0.5 % external ointment; Apply 1 g topically 2 times daily  Dispense: 60 g; Refill: 0  Discussed not using steroid cream longer than 14 days at a time.    Aquaphor twice daily    Discussed general skin care measures    Follow up if symptoms persist or worsen or concerns      Disclaimer:  This note consists of words and symbols derived from keyboarding, dictation, or using voice recognition software. As a result, there may be errors in the script that have gone undetected. Please consider this when interpreting information found in this note.

## 2019-08-23 NOTE — PATIENT INSTRUCTIONS
Aquaphor 3 to 4 times daily every day    Triamcinolone ointment - apply small thin amount twice daily.  Try to take a break from it after 14 days use. Do not longer than 14 days at a time.      General measures -- In clinical experience, avoidance of irritants or exacerbating factors is beneficial for most patients with dyshidrotic eczema. General skin care measures aimed at reducing skin irritation and restoring the skin barrier include:  ?Using lukewarm water and soap-free cleansers to wash hands  ?Drying hands thoroughly after washing  ?Applying emollients (eg, petroleum jelly) immediately after hand drying and as often as possible   ?Wearing cotton gloves under vinyl or other nonlatex gloves when performing wet work  ?Removing rings and watches and bracelets before wet work  ?Wearing protective gloves in cold weather   ?Wearing task-specific gloves for frictional exposures (eg, gardening, carpentry)  ?Avoiding exposure to irritants (eg, detergents, solvents, hair lotions or dyes, acidic foods [eg, citrus fruit])

## 2019-08-23 NOTE — NURSING NOTE
"Chief Complaint   Patient presents with     Derm Problem     Pt states she was seen on 7/12 for a rash on her hands, and it has progressively gotten worse.       Initial /68 (BP Location: Right arm, Patient Position: Sitting, Cuff Size: Adult Regular)   Pulse 76   Temp 98.7  F (37.1  C) (Tympanic)   Resp 18   Ht 1.638 m (5' 4.5\")   Wt 47.5 kg (104 lb 11.2 oz)   LMP 08/19/2019 (Exact Date)   SpO2 98%   Breastfeeding? No   BMI 17.69 kg/m   Estimated body mass index is 17.69 kg/m  as calculated from the following:    Height as of this encounter: 1.638 m (5' 4.5\").    Weight as of this encounter: 47.5 kg (104 lb 11.2 oz).  Medication Reconciliation: complete    Clarita Oneal LPN on 8/23/2019 at 5:36 PM    "

## 2019-08-24 ASSESSMENT — ANXIETY QUESTIONNAIRES: GAD7 TOTAL SCORE: 4

## 2019-12-27 DIAGNOSIS — F32.A ANXIETY AND DEPRESSION: ICD-10-CM

## 2019-12-27 DIAGNOSIS — F41.9 ANXIETY AND DEPRESSION: ICD-10-CM

## 2019-12-27 RX ORDER — CITALOPRAM HYDROBROMIDE 40 MG/1
40 TABLET ORAL DAILY
Qty: 90 TABLET | Refills: 0 | Status: SHIPPED | OUTPATIENT
Start: 2019-12-27 | End: 2020-03-03

## 2019-12-27 NOTE — LETTER
December 27, 2019      Christy Bailey     [place] [address]        Dear Christy,     A refill request was received from your pharmacy for Celexa.    Additional refills require an office visit with Dr. Gonzales for annual review.    Please call [phone] to schedule appointment.      Sincerely,      Refill Nurse

## 2019-12-27 NOTE — TELEPHONE ENCOUNTER
Prescription approved per [facility] Refill Protocol.  LOV: 1/7/19  Patient due for annual review.    Letter sent.  Devora refill given  Terrie Solis RN on 12/27/2019 at 3:31 PM

## 2020-01-06 NOTE — TELEPHONE ENCOUNTER
Mother called asking about a refill for Celexa as they received a letter for an annual appointment. I let her know there was a refill already sent to pharmacy on 12/27/2019. Mother reported appointment scheduled for 3/3/2020. Carol Orozco RN  ....................  1/6/2020   12:47 PM

## 2020-02-19 DIAGNOSIS — N92.0 MENORRHAGIA WITH REGULAR CYCLE: ICD-10-CM

## 2020-02-19 NOTE — TELEPHONE ENCOUNTER
ELINEST 0.3-30 MG-MCG PO tablet          Sig: TAKE 1 TABLET BY MOUTH DAILY           Last Written Prescription Date:  1/7/19  Last Fill Quantity: 84,   # refills: 3  Last Office Visit: 1/7/19  Future Office visit:    Next 5 appointments (look out 90 days)    Mar 03, 2020 12:40 PM CST  SHORT with Charley Gonzales MD  [facility] ([facility]) [address]  [place] [id number] [phone]           Routing refill request to provider for review/approval because:  Has been greater then 1 year since last office visit. To Charley Gonzales to refill medication. Cristina Dominguez RN on 2/19/2020 at 12:36 PM

## 2020-02-20 RX ORDER — NORGESTREL AND ETHINYL ESTRADIOL 0.3-0.03MG
KIT ORAL
Qty: 84 TABLET | Refills: 0 | Status: SHIPPED | OUTPATIENT
Start: 2020-02-20 | End: 2020-03-03

## 2020-03-03 ENCOUNTER — OFFICE VISIT (OUTPATIENT)
Dept: FAMILY MEDICINE | Facility: OTHER | Age: 20
End: 2020-03-03
Attending: FAMILY MEDICINE
Payer: COMMERCIAL

## 2020-03-03 VITALS
WEIGHT: 105 LBS | HEIGHT: 64 IN | OXYGEN SATURATION: 96 % | TEMPERATURE: 98.3 F | HEART RATE: 100 BPM | DIASTOLIC BLOOD PRESSURE: 80 MMHG | SYSTOLIC BLOOD PRESSURE: 120 MMHG | BODY MASS INDEX: 17.93 KG/M2 | RESPIRATION RATE: 16 BRPM

## 2020-03-03 DIAGNOSIS — E55.9 VITAMIN D DEFICIENCY: ICD-10-CM

## 2020-03-03 DIAGNOSIS — N92.0 MENORRHAGIA WITH REGULAR CYCLE: ICD-10-CM

## 2020-03-03 DIAGNOSIS — F32.A ANXIETY AND DEPRESSION: Primary | ICD-10-CM

## 2020-03-03 DIAGNOSIS — F51.01 PRIMARY INSOMNIA: ICD-10-CM

## 2020-03-03 DIAGNOSIS — F41.9 ANXIETY AND DEPRESSION: Primary | ICD-10-CM

## 2020-03-03 DIAGNOSIS — E61.1 IRON DEFICIENCY: ICD-10-CM

## 2020-03-03 LAB
DEPRECATED CALCIDIOL+CALCIFEROL SERPL-MC: 80.7 NG/ML
FERRITIN SERPL-MCNC: 22 NG/ML (ref 23.9–336.2)
HGB BLD-MCNC: 13.6 G/DL (ref 11.7–15.7)
TSH SERPL DL<=0.05 MIU/L-ACNC: 2.85 IU/ML (ref 0.34–5.6)

## 2020-03-03 PROCEDURE — 84443 ASSAY THYROID STIM HORMONE: CPT | Mod: ZL | Performed by: FAMILY MEDICINE

## 2020-03-03 PROCEDURE — 36415 COLL VENOUS BLD VENIPUNCTURE: CPT | Mod: ZL | Performed by: FAMILY MEDICINE

## 2020-03-03 PROCEDURE — 82306 VITAMIN D 25 HYDROXY: CPT | Mod: ZL | Performed by: FAMILY MEDICINE

## 2020-03-03 PROCEDURE — 82728 ASSAY OF FERRITIN: CPT | Mod: ZL | Performed by: FAMILY MEDICINE

## 2020-03-03 PROCEDURE — 85018 HEMOGLOBIN: CPT | Mod: ZL | Performed by: FAMILY MEDICINE

## 2020-03-03 PROCEDURE — 99213 OFFICE O/P EST LOW 20 MIN: CPT | Performed by: FAMILY MEDICINE

## 2020-03-03 RX ORDER — TRAZODONE HYDROCHLORIDE 50 MG/1
50 TABLET, FILM COATED ORAL AT BEDTIME
Qty: 90 TABLET | Refills: 3 | Status: SHIPPED | OUTPATIENT
Start: 2020-03-03 | End: 2024-01-03

## 2020-03-03 RX ORDER — CITALOPRAM HYDROBROMIDE 40 MG/1
40 TABLET ORAL DAILY
Qty: 90 TABLET | Refills: 3 | Status: SHIPPED | OUTPATIENT
Start: 2020-03-03 | End: 2021-03-02

## 2020-03-03 ASSESSMENT — ANXIETY QUESTIONNAIRES
2. NOT BEING ABLE TO STOP OR CONTROL WORRYING: NOT AT ALL
3. WORRYING TOO MUCH ABOUT DIFFERENT THINGS: SEVERAL DAYS
1. FEELING NERVOUS, ANXIOUS, OR ON EDGE: SEVERAL DAYS
5. BEING SO RESTLESS THAT IT IS HARD TO SIT STILL: SEVERAL DAYS
GAD7 TOTAL SCORE: 5
IF YOU CHECKED OFF ANY PROBLEMS ON THIS QUESTIONNAIRE, HOW DIFFICULT HAVE THESE PROBLEMS MADE IT FOR YOU TO DO YOUR WORK, TAKE CARE OF THINGS AT HOME, OR GET ALONG WITH OTHER PEOPLE: SOMEWHAT DIFFICULT
7. FEELING AFRAID AS IF SOMETHING AWFUL MIGHT HAPPEN: NOT AT ALL
6. BECOMING EASILY ANNOYED OR IRRITABLE: NOT AT ALL

## 2020-03-03 ASSESSMENT — PAIN SCALES - GENERAL: PAINLEVEL: NO PAIN (0)

## 2020-03-03 ASSESSMENT — PATIENT HEALTH QUESTIONNAIRE - PHQ9
5. POOR APPETITE OR OVEREATING: MORE THAN HALF THE DAYS
SUM OF ALL RESPONSES TO PHQ QUESTIONS 1-9: 10

## 2020-03-03 ASSESSMENT — MIFFLIN-ST. JEOR: SCORE: 1234.03

## 2020-03-03 NOTE — LETTER
Christy [address]RIAN [address]  [address]   [place] [address]    3/3/2020      Dear Ms. Bailey,      I wanted to let you know about your recent labs.  Your hemoglobin was normal, so you are not anemic.  Your ferritin was a little low, which indicates that your iron stores are a bit low.  If you are not already taking a multivitamin containing iron, I would recommend starting this.  Try to increase your intake of iron containing foods as well.  If you feel that you are already eating as much as you can with respects to iron intake, I would recommend taking an over the counter iron supplement daily.      Your vitamin D was in good range.    Your TSH (thyroid) level was normal as well.    Please contact us at [phone] with any questions or concerns that you have.    I have attached your lab results for your records.        Sincerely,         Charley Gonzales MD     Resulted Orders   Hemoglobin   Result Value Ref Range    Hemoglobin 13.6 11.7 - 15.7 g/dL   Ferritin   Result Value Ref Range    Ferritin 22 (L) 23.9 - 336.2 ng/mL   Vitamin D Total   Result Value Ref Range    Vitamin D Total 80.7 ng/mL      Comment:      Comments:  Deficiency:             <10 ng/mL  Insufficiency:        10-29 ng/mL  Sufficiency:          ng/mL  Possible Toxicity:     >100 ng/mL     Thyrotropin GH   Result Value Ref Range    Thyrotropin 2.85 0.34 - 5.60 IU/mL

## 2020-03-03 NOTE — PROGRESS NOTES
"  SUBJECTIVE:   Nursing Notes:   Kasia Fernandez LPN  3/3/2020 12:55 PM  Signed  Chief Complaint   Patient presents with     Recheck Medication       Initial /80 (BP Location: Right arm, Patient Position: Sitting, Cuff Size: Adult Regular)   Pulse 100   Temp 98.3  F (36.8  C) (Tympanic)   Resp 16   Ht 1.638 m (5' 4.49\")   Wt 47.6 kg (105 lb)   LMP 02/04/2020 (Exact Date)   SpO2 96%   Breastfeeding No   BMI 17.75 kg/m    Estimated body mass index is 17.75 kg/m  as calculated from the following:    Height as of this encounter: 1.638 m (5' 4.49\").    Weight as of this encounter: 47.6 kg (105 lb).  Medication Reconciliation: complete  Kasia Fernandez LPN    Christy Bailey is a 19 year old female who presents to clinic today for refill of meds.    Having a cat has been helpful with her depression.  Having a harder time again finding motivation and has been very tired.  Still is taking vitamin D, 4000 International Units daily.  Needing to use melatonin more to sleep.  Drinks some tea sometimes when she is studying, but her sleep is not any worse than when she doesn't drink it.  Some days she is sleeping past noon.  She is sometimes missing classes in the morning.  She isn't always waking up with her alarm.  Has her own room and doesn't have a roommate any longer.    She does need a refill of the birth control, which she has used for heavy periods in the past.  She has a history of vitamin D deficiency and iron deficiency and is interested in having those labs rechecked today.    HPI    I personally reviewed medications/allergies/history listed below:    Patient Active Problem List    Diagnosis Date Noted     Hair loss 11/21/2018     Priority: Medium     Anxiety and depression 08/21/2018     Priority: Medium     Murmur 02/22/2018     Priority: Medium     Overview:   Still's murmur.       Iron deficiency 08/07/2017     Priority: Medium     Menorrhagia with regular cycle 05/22/2017     Priority: " Medium     Contact dermatitis and eczema 2012     Priority: Medium     History reviewed. No pertinent past medical history.   History reviewed. No pertinent surgical history.  Family History   Problem Relation Age of Onset     Other - See Comments Mother         GI Disease,ulcerative colitis     Allergy (Severe) Mother         Allergies,seasonal     Asthma Mother         Asthma     Family History Negative Father         Good Health     Other - See Comments Paternal Grandfather         sarcoidosis     Ovarian Cancer Paternal Grandmother 52        Cancer-ovarian, of ovarian cancer. diabetes on her side of family     Other - See Comments Brother         complicated  course.  See chart for details.     Family History Negative Sister         Good Health     Social History     Tobacco Use     Smoking status: Never Smoker     Smokeless tobacco: Never Used   Substance Use Topics     Alcohol use: Yes     Comment: Occassional     Social History     Social History Narrative    She lives with mother (Diana), father (Adonay), one sister, and one brother.  She is the oldest child.      Mom Diana.      Dad Adonay.      Sister Temo (Nina).  Date of birth:  00.      Brother Maynor.  Date of birth:  02.      Attending [employer].  Pursuing a degree in Asian studies with a focus on [place] culture.     Current Outpatient Medications   Medication Sig Dispense Refill     Cholecalciferol (D 5000) 5000 UNITS TABS Take 4,000 Units by mouth daily        citalopram (CELEXA) 40 MG tablet Take 1 tablet (40 mg) by mouth daily 90 tablet 3     Multiple Vitamin (MULTI-VITAMINS) TABS Take 1 tablet by mouth daily       norgestrel-ethinyl estradiol (ELINEST) 0.3-30 MG-MCG tablet Take 1 tablet by mouth daily 84 tablet 3     order for DME SAD light.  Diagnosis:  F33.9, F41.8.  Length of need:  99.       traZODone (DESYREL) 50 MG tablet Take 1 tablet (50 mg) by mouth At Bedtime 90 tablet 3     No Known  "Allergies    Review of Systems   Constitutional: Negative for chills and fever.   Cardiovascular: Negative for peripheral edema.   Psychiatric/Behavioral: Positive for mood changes and sleep disturbance.        OBJECTIVE:     /80 (BP Location: Right arm, Patient Position: Sitting, Cuff Size: Adult Regular)   Pulse 100   Temp 98.3  F (36.8  C) (Tympanic)   Resp 16   Ht 1.638 m (5' 4.49\")   Wt 47.6 kg (105 lb)   LMP 02/04/2020 (Exact Date)   SpO2 96%   Breastfeeding No   BMI 17.75 kg/m    Body mass index is 17.75 kg/m .  Physical Exam  Constitutional:       Appearance: Normal appearance.   HENT:      Head: Normocephalic.   Eyes:      Extraocular Movements: Extraocular movements intact.      Pupils: Pupils are equal, round, and reactive to light.   Neck:      Musculoskeletal: Normal range of motion and neck supple.   Cardiovascular:      Rate and Rhythm: Normal rate and regular rhythm.      Heart sounds: No murmur.   Pulmonary:      Effort: Pulmonary effort is normal.      Breath sounds: Normal breath sounds. No wheezing, rhonchi or rales.   Musculoskeletal:      Right lower leg: No edema.      Left lower leg: No edema.   Lymphadenopathy:      Cervical: No cervical adenopathy.   Neurological:      Mental Status: She is alert.   Psychiatric:         Mood and Affect: Mood normal.         Behavior: Behavior normal.           PHQ-9 SCORE 1/7/2019 8/23/2019 3/3/2020   PHQ-9 Total Score 10 6 10         TOMMY-7 SCORE 1/7/2019 8/23/2019 3/3/2020   Total Score 7 4 5         I personally reviewed results withpatient as listed below:   Diagnostic Test Results:  none     ASSESSMENT/PLAN:       ICD-10-CM    1. Anxiety and depression F41.9 citalopram (CELEXA) 40 MG tablet    F32.9    2. Primary insomnia F51.01 traZODone (DESYREL) 50 MG tablet     Thyrotropin GH     Thyrotropin GH   3. Menorrhagia with regular cycle N92.0 norgestrel-ethinyl estradiol (ELINEST) 0.3-30 MG-MCG tablet   4. Iron deficiency E61.1 Ferritin     " Hemoglobin     Hemoglobin     Ferritin   5. Vitamin D deficiency E55.9 Vitamin D Total     Vitamin D Total         1.  With her lack of motivation and some worsening symptoms, will increase her citalopram to 40 mg daily.  She should follow-up if symptoms are worsening or not improving.  2.  Trial of trazodone 50 mg p.o. nightly as needed for sleep.  Again if not improving, she should follow-up.  Labs completed as above.  3.  OCPs refilled today as above.  4.  Ferritin and hemoglobin completed today as above.  5.  Repeat vitamin D also obtained as above today.    Charley Gonzales MD  [facility]    Portions of this dictation were created using the Dragon Nuance voice recognition system. Proofreading was completed but there may be errors in text.

## 2020-03-03 NOTE — NURSING NOTE
"Chief Complaint   Patient presents with     Recheck Medication       Initial /80 (BP Location: Right arm, Patient Position: Sitting, Cuff Size: Adult Regular)   Pulse 100   Temp 98.3  F (36.8  C) (Tympanic)   Resp 16   Ht 1.638 m (5' 4.49\")   Wt 47.6 kg (105 lb)   LMP 02/04/2020 (Exact Date)   SpO2 96%   Breastfeeding No   BMI 17.75 kg/m   Estimated body mass index is 17.75 kg/m  as calculated from the following:    Height as of this encounter: 1.638 m (5' 4.49\").    Weight as of this encounter: 47.6 kg (105 lb).  Medication Reconciliation: complete  Kasia Fernandez LPN  "

## 2020-03-04 ASSESSMENT — ENCOUNTER SYMPTOMS
FEVER: 0
SLEEP DISTURBANCE: 1
CHILLS: 0

## 2020-03-04 ASSESSMENT — ANXIETY QUESTIONNAIRES: GAD7 TOTAL SCORE: 5

## 2020-07-29 ENCOUNTER — TELEPHONE (OUTPATIENT)
Dept: FAMILY MEDICINE | Facility: OTHER | Age: 20
End: 2020-07-29

## 2020-07-29 NOTE — TELEPHONE ENCOUNTER
Called and verified patient full name and . Notified patient that we have no record. She is wanting to donate blood. Advised her that she should not have to know blood type to donate. No other questions or concerns.     Fern Fernando LPN on 2020 at 2:09 PM

## 2021-01-13 ENCOUNTER — ALLIED HEALTH/NURSE VISIT (OUTPATIENT)
Dept: FAMILY MEDICINE | Facility: OTHER | Age: 21
End: 2021-01-13
Attending: FAMILY MEDICINE
Payer: COMMERCIAL

## 2021-01-13 DIAGNOSIS — R11.0 NAUSEA: Primary | ICD-10-CM

## 2021-01-13 LAB
SARS-COV-2 RNA RESP QL NAA+PROBE: NORMAL
SPECIMEN SOURCE: NORMAL

## 2021-01-13 PROCEDURE — U0005 INFEC AGEN DETEC AMPLI PROBE: HCPCS | Mod: ZL | Performed by: FAMILY MEDICINE

## 2021-01-13 PROCEDURE — C9803 HOPD COVID-19 SPEC COLLECT: HCPCS

## 2021-01-13 PROCEDURE — U0003 INFECTIOUS AGENT DETECTION BY NUCLEIC ACID (DNA OR RNA); SEVERE ACUTE RESPIRATORY SYNDROME CORONAVIRUS 2 (SARS-COV-2) (CORONAVIRUS DISEASE [COVID-19]), AMPLIFIED PROBE TECHNIQUE, MAKING USE OF HIGH THROUGHPUT TECHNOLOGIES AS DESCRIBED BY CMS-2020-01-R: HCPCS | Mod: ZL | Performed by: FAMILY MEDICINE

## 2021-01-13 NOTE — PROGRESS NOTES
Chief Complaint   Patient presents with     Covid 19 Testing     Nausea    Medication Reconciliation: complete    Ilene Maravilla LPN

## 2021-01-14 LAB
LABORATORY COMMENT REPORT: NORMAL
SARS-COV-2 RNA RESP QL NAA+PROBE: NEGATIVE
SPECIMEN SOURCE: NORMAL

## 2021-02-27 DIAGNOSIS — F32.A ANXIETY AND DEPRESSION: ICD-10-CM

## 2021-02-27 DIAGNOSIS — F41.9 ANXIETY AND DEPRESSION: ICD-10-CM

## 2021-02-27 NOTE — LETTER
March 2, 2021      Christy Bailey     [place] [address]        Dear Christy,           This letter is to remind you that you are due for your annual exam with Charley Gonzales. Your last comprehensive visit was more than 12 months ago.    A refill of Celexa has been sent to your provider for review and consideration. Additional refills require you to complete this appointment.    Please call the clinic at [phone] to schedule your appointment.    If you should require additional refills before your scheduled appointment, please contact your pharmacy and we will refill your medication until the date of your appointment.    If you are no longer seeing Charley Gonzales for primary care, please call to let us know.       Thank you for choosing [facility] for your health care needs.    Sincerely,    Refill SELVIN  [facility]

## 2021-03-02 RX ORDER — CITALOPRAM HYDROBROMIDE 40 MG/1
40 TABLET ORAL DAILY
Qty: 90 TABLET | Refills: 0 | Status: SHIPPED | OUTPATIENT
Start: 2021-03-02 | End: 2021-03-23

## 2021-03-02 NOTE — TELEPHONE ENCOUNTER
" Disp Refills Start End CLARITZA   citalopram (CELEXA) 40 MG tablet 90 tablet 3 3/3/2020  No   Sig - Route: Take 1 tablet (40 mg) by mouth daily - Oral       LOV: 3/3/2020  Future Office visit: NO future appointment scheduled at this time.  Annual reminder letter sent to patient to schedule an appointment.     Routing refill request to provider for review/approval because:  Failed protocol    Requested Prescriptions   Pending Prescriptions Disp Refills     citalopram (CELEXA) 40 MG tablet [Pharmacy Med Name: CITALOPRAM 40MG TABLET] 90 tablet 3     Sig: TAKE 1 TABLET (40 MG) BY MOUTH DAILY       SSRIs Protocol Failed - 2/27/2021  1:00 AM        Failed - PHQ-9 score less than 5 in past 6 months     Please review last PHQ-9 score.   10  3/3/2020        Failed - Recent (6 mo) or future (30 days) visit within the authorizing provider's specialty     Patient had office visit in the last 6 months or has a visit in the next 30 days with authorizing provider or within the authorizing provider's specialty.  See \"Patient Info\" tab in inbasket, or \"Choose Columns\" in Meds & Orders section of the refill encounter.            Passed - Medication is active on med list        Passed - Patient is age 18 or older        Passed - No active pregnancy on record        Passed - No positive pregnancy test in last 12 months         Unable to complete prescription refill per RN Medication Refill Policy.................... Carol Orozco RN ....................  3/2/2021   9:40 AM        "

## 2021-03-05 ENCOUNTER — PATIENT OUTREACH (OUTPATIENT)
Dept: FAMILY MEDICINE | Facility: OTHER | Age: 21
End: 2021-03-05

## 2021-03-05 NOTE — TELEPHONE ENCOUNTER
Patient Quality Outreach      Summary:    Patient has the following on her problem list/HM:     Depression / Dysthymia review      PHQ-9 SCORE 1/7/2019 8/23/2019 3/3/2020   PHQ-9 Total Score 10 6 10       If PHQ-9 recheck is 5 or more, route to provider for next steps.    Patient is due/failing the following:   PHQ-9 Needed    Type of outreach:    Sent ExSafe message.    Questions for provider review:    None                                          Elizabeth Em LPN on 3/5/2021 at 2:05 PM

## 2021-03-06 ENCOUNTER — HEALTH MAINTENANCE LETTER (OUTPATIENT)
Age: 21
End: 2021-03-06

## 2021-03-23 ENCOUNTER — OFFICE VISIT (OUTPATIENT)
Dept: FAMILY MEDICINE | Facility: OTHER | Age: 21
End: 2021-03-23
Attending: FAMILY MEDICINE
Payer: COMMERCIAL

## 2021-03-23 VITALS
DIASTOLIC BLOOD PRESSURE: 88 MMHG | TEMPERATURE: 99.1 F | RESPIRATION RATE: 20 BRPM | WEIGHT: 110 LBS | BODY MASS INDEX: 18.33 KG/M2 | HEIGHT: 65 IN | SYSTOLIC BLOOD PRESSURE: 122 MMHG | HEART RATE: 100 BPM

## 2021-03-23 DIAGNOSIS — Z12.4 SCREENING FOR CERVICAL CANCER: ICD-10-CM

## 2021-03-23 DIAGNOSIS — Z23 NEED FOR TDAP VACCINATION: ICD-10-CM

## 2021-03-23 DIAGNOSIS — N92.0 MENORRHAGIA WITH REGULAR CYCLE: Primary | ICD-10-CM

## 2021-03-23 DIAGNOSIS — R04.0 EPISTAXIS: ICD-10-CM

## 2021-03-23 DIAGNOSIS — F32.A ANXIETY AND DEPRESSION: ICD-10-CM

## 2021-03-23 DIAGNOSIS — Z00.00 HEALTH CARE MAINTENANCE: Primary | ICD-10-CM

## 2021-03-23 DIAGNOSIS — R79.1 PROLONGED PTT: ICD-10-CM

## 2021-03-23 DIAGNOSIS — F41.9 ANXIETY AND DEPRESSION: ICD-10-CM

## 2021-03-23 DIAGNOSIS — N92.0 MENORRHAGIA WITH REGULAR CYCLE: ICD-10-CM

## 2021-03-23 LAB
ALBUMIN SERPL-MCNC: 4.6 G/DL (ref 3.5–5.7)
ALP SERPL-CCNC: 36 U/L (ref 34–104)
ALT SERPL W P-5'-P-CCNC: 9 U/L (ref 7–52)
ANION GAP SERPL CALCULATED.3IONS-SCNC: 9 MMOL/L (ref 3–14)
APTT PPP: 60 SEC (ref 22–37)
AST SERPL W P-5'-P-CCNC: 14 U/L (ref 13–39)
BASOPHILS # BLD AUTO: 0 10E9/L (ref 0–0.2)
BASOPHILS NFR BLD AUTO: 0.4 %
BILIRUB SERPL-MCNC: 0.5 MG/DL (ref 0.3–1)
BUN SERPL-MCNC: 11 MG/DL (ref 7–25)
CALCIUM SERPL-MCNC: 9.6 MG/DL (ref 8.6–10.3)
CHLORIDE SERPL-SCNC: 106 MMOL/L (ref 98–107)
CO2 SERPL-SCNC: 24 MMOL/L (ref 21–31)
CREAT SERPL-MCNC: 0.79 MG/DL (ref 0.6–1.2)
DIFFERENTIAL METHOD BLD: NORMAL
EOSINOPHIL # BLD AUTO: 0.1 10E9/L (ref 0–0.7)
EOSINOPHIL NFR BLD AUTO: 1.5 %
ERYTHROCYTE [DISTWIDTH] IN BLOOD BY AUTOMATED COUNT: 12.2 % (ref 10–15)
GFR SERPL CREATININE-BSD FRML MDRD: >90 ML/MIN/{1.73_M2}
GLUCOSE SERPL-MCNC: 126 MG/DL (ref 70–105)
HCT VFR BLD AUTO: 37.7 % (ref 35–47)
HGB BLD-MCNC: 13.2 G/DL (ref 11.7–15.7)
IMM GRANULOCYTES # BLD: 0 10E9/L (ref 0–0.4)
IMM GRANULOCYTES NFR BLD: 0.1 %
INR PPP: 1.13 (ref 0.86–1.14)
LYMPHOCYTES # BLD AUTO: 1.4 10E9/L (ref 0.8–5.3)
LYMPHOCYTES NFR BLD AUTO: 19.9 %
MCH RBC QN AUTO: 31.6 PG (ref 26.5–33)
MCHC RBC AUTO-ENTMCNC: 35 G/DL (ref 31.5–36.5)
MCV RBC AUTO: 90 FL (ref 78–100)
MONOCYTES # BLD AUTO: 0.3 10E9/L (ref 0–1.3)
MONOCYTES NFR BLD AUTO: 4.4 %
NEUTROPHILS # BLD AUTO: 5.1 10E9/L (ref 1.6–8.3)
NEUTROPHILS NFR BLD AUTO: 73.7 %
PLATELET # BLD AUTO: 214 10E9/L (ref 150–450)
POTASSIUM SERPL-SCNC: 3.7 MMOL/L (ref 3.5–5.1)
PROT SERPL-MCNC: 7 G/DL (ref 6.4–8.9)
RBC # BLD AUTO: 4.18 10E12/L (ref 3.8–5.2)
SODIUM SERPL-SCNC: 139 MMOL/L (ref 134–144)
TSH SERPL DL<=0.05 MIU/L-ACNC: 2.46 IU/ML (ref 0.34–5.6)
WBC # BLD AUTO: 6.9 10E9/L (ref 4–11)

## 2021-03-23 PROCEDURE — 85730 THROMBOPLASTIN TIME PARTIAL: CPT | Mod: ZL | Performed by: FAMILY MEDICINE

## 2021-03-23 PROCEDURE — 90471 IMMUNIZATION ADMIN: CPT | Performed by: FAMILY MEDICINE

## 2021-03-23 PROCEDURE — G0123 SCREEN CERV/VAG THIN LAYER: HCPCS | Performed by: FAMILY MEDICINE

## 2021-03-23 PROCEDURE — 85610 PROTHROMBIN TIME: CPT | Mod: ZL | Performed by: FAMILY MEDICINE

## 2021-03-23 PROCEDURE — 80053 COMPREHEN METABOLIC PANEL: CPT | Mod: ZL | Performed by: FAMILY MEDICINE

## 2021-03-23 PROCEDURE — 99395 PREV VISIT EST AGE 18-39: CPT | Mod: 25 | Performed by: FAMILY MEDICINE

## 2021-03-23 PROCEDURE — 85025 COMPLETE CBC W/AUTO DIFF WBC: CPT | Mod: ZL | Performed by: FAMILY MEDICINE

## 2021-03-23 PROCEDURE — 36415 COLL VENOUS BLD VENIPUNCTURE: CPT | Mod: ZL | Performed by: FAMILY MEDICINE

## 2021-03-23 PROCEDURE — 90715 TDAP VACCINE 7 YRS/> IM: CPT | Performed by: FAMILY MEDICINE

## 2021-03-23 PROCEDURE — 88142 CYTOPATH C/V THIN LAYER: CPT | Performed by: FAMILY MEDICINE

## 2021-03-23 PROCEDURE — 84443 ASSAY THYROID STIM HORMONE: CPT | Mod: ZL | Performed by: FAMILY MEDICINE

## 2021-03-23 RX ORDER — NORGESTREL AND ETHINYL ESTRADIOL 0.3-0.03MG
1 KIT ORAL DAILY
Qty: 84 TABLET | Refills: 3 | Status: SHIPPED | OUTPATIENT
Start: 2021-03-23 | End: 2022-02-16

## 2021-03-23 RX ORDER — CITALOPRAM HYDROBROMIDE 40 MG/1
40 TABLET ORAL DAILY
Qty: 90 TABLET | Refills: 3 | Status: SHIPPED | OUTPATIENT
Start: 2021-03-23 | End: 2022-03-04

## 2021-03-23 ASSESSMENT — ANXIETY QUESTIONNAIRES
IF YOU CHECKED OFF ANY PROBLEMS ON THIS QUESTIONNAIRE, HOW DIFFICULT HAVE THESE PROBLEMS MADE IT FOR YOU TO DO YOUR WORK, TAKE CARE OF THINGS AT HOME, OR GET ALONG WITH OTHER PEOPLE: SOMEWHAT DIFFICULT
2. NOT BEING ABLE TO STOP OR CONTROL WORRYING: NOT AT ALL
1. FEELING NERVOUS, ANXIOUS, OR ON EDGE: SEVERAL DAYS
6. BECOMING EASILY ANNOYED OR IRRITABLE: NOT AT ALL
GAD7 TOTAL SCORE: 5
3. WORRYING TOO MUCH ABOUT DIFFERENT THINGS: NOT AT ALL
7. FEELING AFRAID AS IF SOMETHING AWFUL MIGHT HAPPEN: NOT AT ALL
5. BEING SO RESTLESS THAT IT IS HARD TO SIT STILL: MORE THAN HALF THE DAYS

## 2021-03-23 ASSESSMENT — ENCOUNTER SYMPTOMS
NERVOUS/ANXIOUS: 0
SHORTNESS OF BREATH: 0
FEVER: 0
COUGH: 0
CHILLS: 0

## 2021-03-23 ASSESSMENT — PATIENT HEALTH QUESTIONNAIRE - PHQ9
5. POOR APPETITE OR OVEREATING: MORE THAN HALF THE DAYS
SUM OF ALL RESPONSES TO PHQ QUESTIONS 1-9: 13

## 2021-03-23 ASSESSMENT — MIFFLIN-ST. JEOR: SCORE: 1259.84

## 2021-03-23 NOTE — NURSING NOTE
Chief Complaint   Patient presents with     Physical         Medication Reconciliation: complete    Adilia Rodriguez, LPN

## 2021-03-23 NOTE — PROGRESS NOTES
SUBJECTIVE:   Nursing Notes:   Adilia Rodriguez LPN  3/23/2021  2:12 PM  Sign at exiting of workspace  Chief Complaint   Patient presents with     Physical         Medication Reconciliation: complete    Adilia Rodriguez LPN        Christy Bailey is a 20 year old female who presents to clinic today for a physical.    She found out her blood type is O positive when she had given blood.    Has been getting bloody noses about once a week over the winter months this year.  She has a prior history of heavy periods, which she is using oral contraceptives for, which seems to help.      Her anxiety and depression seem to be fairly well controlled right now.    HPI    I personally reviewed medications/allergies/history listed below:    Patient Active Problem List    Diagnosis Date Noted     Hair loss 2018     Priority: Medium     Anxiety and depression 2018     Priority: Medium     Murmur 2018     Priority: Medium     Overview:   Still's murmur.       Iron deficiency 2017     Priority: Medium     Menorrhagia with regular cycle 2017     Priority: Medium     Contact dermatitis and eczema 2012     Priority: Medium     Past Medical History:   Diagnosis Date     Type O blood, Rh positive       No past surgical history on file.  Family History   Problem Relation Age of Onset     Other - See Comments Mother         GI Disease,ulcerative colitis     Allergy (Severe) Mother         Allergies,seasonal     Asthma Mother         Asthma     Family History Negative Father         Good Health     Other - See Comments Paternal Grandfather         sarcoidosis     Ovarian Cancer Paternal Grandmother 52        Cancer-ovarian, of ovarian cancer. diabetes on her side of family     Other - See Comments Brother         complicated  course.  See chart for details.     Family History Negative Sister         Good Health     Social History     Tobacco Use     Smoking status: Never  "Smoker     Smokeless tobacco: Never Used   Substance Use Topics     Alcohol use: Yes     Comment: Occassional     Social History     Social History Narrative    She lives with mother (Diana), father (Adonay), one sister, and one brother.  She is the oldest child.      Mom Diana.      Dad Adonay.      Sister Temo (Nina).  Date of birth:  05/04/00.      Brother Maynor.  Date of birth:  04/12/02.      Attended [employer].  Pursuing a degree in Asian studies with a focus on [place] culture.    She is finishing her [employer] degree in spring 2021.     Current Outpatient Medications   Medication Sig Dispense Refill     Cholecalciferol (D 5000) 5000 UNITS TABS Take 4,000 Units by mouth daily        citalopram (CELEXA) 40 MG tablet Take 1 tablet (40 mg) by mouth daily 90 tablet 3     Multiple Vitamin (MULTI-VITAMINS) TABS Take 1 tablet by mouth daily       norgestrel-ethinyl estradiol (ELINEST) 0.3-30 MG-MCG tablet Take 1 tablet by mouth daily 84 tablet 3     order for DME SAD light.  Diagnosis:  F33.9, F41.8.  Length of need:  99.       traZODone (DESYREL) 50 MG tablet Take 1 tablet (50 mg) by mouth At Bedtime 90 tablet 3     No Known Allergies    Review of Systems   Constitutional: Negative for chills and fever.   Respiratory: Negative for cough and shortness of breath.    Cardiovascular: Negative for peripheral edema.   Psychiatric/Behavioral: Negative for mood changes. The patient is not nervous/anxious.         OBJECTIVE:     /88 (BP Location: Right arm, Patient Position: Sitting, Cuff Size: Adult Regular)   Pulse 100   Temp 99.1  F (37.3  C)   Resp 20   Ht 1.651 m (5' 5\")   Wt 49.9 kg (110 lb)   LMP 03/01/2021   BMI 18.30 kg/m    Body mass index is 18.3 kg/m .  Physical Exam  Constitutional:       General: She is not in acute distress.     Appearance: She is well-developed.   HENT:      Head: Normocephalic.      Right Ear: Tympanic membrane and external ear normal.      Left Ear: Tympanic " membrane and external ear normal.      Nose: Nose normal.      Mouth/Throat:      Pharynx: No oropharyngeal exudate.   Eyes:      General:         Right eye: No discharge.         Left eye: No discharge.      Conjunctiva/sclera: Conjunctivae normal.      Pupils: Pupils are equal, round, and reactive to light.   Neck:      Musculoskeletal: Neck supple.      Thyroid: No thyromegaly.      Trachea: No tracheal deviation.   Cardiovascular:      Rate and Rhythm: Normal rate and regular rhythm.      Pulses: Normal pulses.      Heart sounds: Normal heart sounds, S1 normal and S2 normal. No murmur. No friction rub. No gallop. No S3 or S4 sounds.    Pulmonary:      Effort: Pulmonary effort is normal. No respiratory distress.      Breath sounds: Normal breath sounds. No wheezing or rales.      Comments: Breast exam:  No masses palpable bilaterally.  No skin changes, tethering or axillary lymphadenopathy bilaterally.    Abdominal:      General: Bowel sounds are normal. There is no distension.      Palpations: Abdomen is soft. There is no mass.      Tenderness: There is no abdominal tenderness.   Genitourinary:     Comments: Pelvic Exam:  Vulva: No external lesions, normal hair distribution, no adenopathy  Vagina: Moist, pink, no abnormal discharge, well rugated, no lesions  Cervix: Pap smear  obtained.  Cervix is nulliparous, smooth, pink, no visible lesions  Uterus: Normal size, anteverted, non-tender, mobile  Ovaries: No mass, non-tender, mobile  Musculoskeletal: Normal range of motion.   Lymphadenopathy:      Cervical: No cervical adenopathy.   Skin:     General: Skin is warm and dry.      Findings: No rash.   Neurological:      Mental Status: She is alert and oriented to person, place, and time.      Motor: No abnormal muscle tone.      Deep Tendon Reflexes: Reflexes are normal and symmetric.   Psychiatric:         Thought Content: Thought content normal.         Judgment: Judgment normal.           PHQ-9 SCORE 8/23/2019  3/3/2020 3/23/2021   PHQ-9 Total Score 6 10 13         TOMMY-7 SCORE 8/23/2019 3/3/2020 3/23/2021   Total Score 4 5 5         I personally reviewed results withpatient as listed below:   Diagnostic Test Results:  none     ASSESSMENT/PLAN:       ICD-10-CM    1. Health care maintenance  Z00.00    2. Screening for cervical cancer  Z12.4 Pap Screen Thin Prep only - recommended age 21 - 24 years   3. Need for Tdap vaccination  Z23 TDAP VACCINE (Adacel, Boostrix)  [[id number]]   4. Anxiety and depression  F41.9 citalopram (CELEXA) 40 MG tablet    F32.9 TSH Reflex GH     TSH Reflex GH   5. Menorrhagia with regular cycle  N92.0 norgestrel-ethinyl estradiol (ELINEST) 0.3-30 MG-MCG tablet     CBC with platelets differential     INR     APTT     TSH Reflex GH     Comprehensive metabolic panel     Comprehensive metabolic panel     TSH Reflex GH     APTT     INR     CBC with platelets differential   6. Epistaxis  R04.0 CBC with platelets differential     INR     APTT     Comprehensive metabolic panel     Comprehensive metabolic panel     APTT     INR     CBC with platelets differential       1.  Pap Smear completed today as noted above.  Tdap is updated today as well.  2.  Pap Smear as above.  3.  Tdap update as above.  4.  Stable.  TSH as above.  Citalopram refilled.  5.  Oral contraceptives refilled.  Labs as above.  6.  Labs as above for further evaluation.    Charley Gonzales MD  [facility]    Portions of this dictation were created using the Dragon Nuance voice recognition system. Proofreading was completed but there may be errors in text.

## 2021-03-24 ENCOUNTER — TELEPHONE (OUTPATIENT)
Dept: FAMILY MEDICINE | Facility: OTHER | Age: 21
End: 2021-03-24

## 2021-03-24 ASSESSMENT — ANXIETY QUESTIONNAIRES: GAD7 TOTAL SCORE: 5

## 2021-03-24 NOTE — TELEPHONE ENCOUNTER
Left message to call back, has a result note....................Angelica Rodriguez LPN  3/24/2021   12:09 PM

## 2021-03-26 ENCOUNTER — MYC MEDICAL ADVICE (OUTPATIENT)
Dept: FAMILY MEDICINE | Facility: OTHER | Age: 21
End: 2021-03-26

## 2021-03-26 LAB
COPATH REPORT: NORMAL
PAP: NORMAL

## 2021-04-22 ENCOUNTER — ONCOLOGY VISIT (OUTPATIENT)
Dept: ONCOLOGY | Facility: OTHER | Age: 21
End: 2021-04-22
Attending: FAMILY MEDICINE
Payer: COMMERCIAL

## 2021-04-22 VITALS
HEIGHT: 65 IN | BODY MASS INDEX: 17.83 KG/M2 | RESPIRATION RATE: 16 BRPM | DIASTOLIC BLOOD PRESSURE: 68 MMHG | SYSTOLIC BLOOD PRESSURE: 92 MMHG | OXYGEN SATURATION: 99 % | TEMPERATURE: 98.4 F | WEIGHT: 107 LBS | HEART RATE: 80 BPM

## 2021-04-22 DIAGNOSIS — N92.0 MENORRHAGIA WITH REGULAR CYCLE: ICD-10-CM

## 2021-04-22 DIAGNOSIS — R79.1 PROLONGED PTT: ICD-10-CM

## 2021-04-22 DIAGNOSIS — R04.0 EPISTAXIS: Primary | ICD-10-CM

## 2021-04-22 LAB
ALBUMIN SERPL-MCNC: 4.5 G/DL (ref 3.5–5.7)
ALP SERPL-CCNC: 31 U/L (ref 34–104)
ALT SERPL W P-5'-P-CCNC: 6 U/L (ref 7–52)
ANION GAP SERPL CALCULATED.3IONS-SCNC: 6 MMOL/L (ref 3–14)
APTT PPP: 33 SEC (ref 22–37)
AST SERPL W P-5'-P-CCNC: 12 U/L (ref 13–39)
BASOPHILS # BLD AUTO: 0 10E9/L (ref 0–0.2)
BASOPHILS NFR BLD AUTO: 0.6 %
BILIRUB SERPL-MCNC: 0.4 MG/DL (ref 0.3–1)
BUN SERPL-MCNC: 11 MG/DL (ref 7–25)
CALCIUM SERPL-MCNC: 9.9 MG/DL (ref 8.6–10.3)
CHLORIDE SERPL-SCNC: 105 MMOL/L (ref 98–107)
CO2 SERPL-SCNC: 27 MMOL/L (ref 21–31)
CREAT SERPL-MCNC: 0.81 MG/DL (ref 0.6–1.2)
D DIMER PPP FEU-MCNC: <0.3 UG/ML FEU (ref 0–0.5)
DIFFERENTIAL METHOD BLD: NORMAL
EOSINOPHIL # BLD AUTO: 0.1 10E9/L (ref 0–0.7)
EOSINOPHIL NFR BLD AUTO: 2.3 %
ERYTHROCYTE [DISTWIDTH] IN BLOOD BY AUTOMATED COUNT: 11.9 % (ref 10–15)
GFR SERPL CREATININE-BSD FRML MDRD: 88 ML/MIN/{1.73_M2}
GLUCOSE SERPL-MCNC: 93 MG/DL (ref 70–105)
HCT VFR BLD AUTO: 37.6 % (ref 35–47)
HGB BLD-MCNC: 12.9 G/DL (ref 11.7–15.7)
IMM GRANULOCYTES # BLD: 0 10E9/L (ref 0–0.4)
IMM GRANULOCYTES NFR BLD: 0.2 %
INR PPP: 1.06 (ref 0.86–1.14)
LDH SERPL L TO P-CCNC: 107 U/L (ref 140–271)
LYMPHOCYTES # BLD AUTO: 1.6 10E9/L (ref 0.8–5.3)
LYMPHOCYTES NFR BLD AUTO: 26.2 %
MCH RBC QN AUTO: 31.5 PG (ref 26.5–33)
MCHC RBC AUTO-ENTMCNC: 34.3 G/DL (ref 31.5–36.5)
MCV RBC AUTO: 92 FL (ref 78–100)
MONOCYTES # BLD AUTO: 0.3 10E9/L (ref 0–1.3)
MONOCYTES NFR BLD AUTO: 5.5 %
NEUTROPHILS # BLD AUTO: 4 10E9/L (ref 1.6–8.3)
NEUTROPHILS NFR BLD AUTO: 65.2 %
PLATELET # BLD AUTO: 210 10E9/L (ref 150–450)
POTASSIUM SERPL-SCNC: 3.9 MMOL/L (ref 3.5–5.1)
PROT SERPL-MCNC: 7.5 G/DL (ref 6.4–8.9)
RBC # BLD AUTO: 4.09 10E12/L (ref 3.8–5.2)
SODIUM SERPL-SCNC: 138 MMOL/L (ref 134–144)
WBC # BLD AUTO: 6.2 10E9/L (ref 4–11)

## 2021-04-22 PROCEDURE — 84165 PROTEIN E-PHORESIS SERUM: CPT | Mod: 26 | Performed by: PATHOLOGY

## 2021-04-22 PROCEDURE — 36415 COLL VENOUS BLD VENIPUNCTURE: CPT | Mod: ZL | Performed by: INTERNAL MEDICINE

## 2021-04-22 PROCEDURE — 85245 CLOT FACTOR VIII VW RISTOCTN: CPT | Mod: ZL | Performed by: INTERNAL MEDICINE

## 2021-04-22 PROCEDURE — 85246 CLOT FACTOR VIII VW ANTIGEN: CPT | Mod: ZL | Performed by: INTERNAL MEDICINE

## 2021-04-22 PROCEDURE — 999N001037 HC STATISTIC VON WILLEBRAND MULTIMERS: Mod: ZL | Performed by: INTERNAL MEDICINE

## 2021-04-22 PROCEDURE — 84165 PROTEIN E-PHORESIS SERUM: CPT | Mod: TC,ZL | Performed by: INTERNAL MEDICINE

## 2021-04-22 PROCEDURE — 85670 THROMBIN TIME PLASMA: CPT | Mod: ZL | Performed by: INTERNAL MEDICINE

## 2021-04-22 PROCEDURE — 85379 FIBRIN DEGRADATION QUANT: CPT | Mod: ZL | Performed by: INTERNAL MEDICINE

## 2021-04-22 PROCEDURE — 86334 IMMUNOFIX E-PHORESIS SERUM: CPT | Mod: TC,ZL | Performed by: INTERNAL MEDICINE

## 2021-04-22 PROCEDURE — 85240 CLOT FACTOR VIII AHG 1 STAGE: CPT | Mod: ZL | Performed by: INTERNAL MEDICINE

## 2021-04-22 PROCEDURE — 999N001036 HC STATISTIC TOTAL PROTEIN: Mod: ZL | Performed by: INTERNAL MEDICINE

## 2021-04-22 PROCEDURE — 82784 ASSAY IGA/IGD/IGG/IGM EACH: CPT | Mod: ZL,91 | Performed by: INTERNAL MEDICINE

## 2021-04-22 PROCEDURE — 85597 PHOSPHOLIPID PLTLT NEUTRALIZ: CPT | Mod: ZL | Performed by: INTERNAL MEDICINE

## 2021-04-22 PROCEDURE — 85260 CLOT FACTOR X STUART-POWER: CPT | Mod: ZL | Performed by: INTERNAL MEDICINE

## 2021-04-22 PROCEDURE — 80053 COMPREHEN METABOLIC PANEL: CPT | Mod: ZL | Performed by: INTERNAL MEDICINE

## 2021-04-22 PROCEDURE — 86334 IMMUNOFIX E-PHORESIS SERUM: CPT | Mod: 26 | Performed by: PATHOLOGY

## 2021-04-22 PROCEDURE — 85730 THROMBOPLASTIN TIME PARTIAL: CPT | Mod: ZL,91 | Performed by: INTERNAL MEDICINE

## 2021-04-22 PROCEDURE — 99205 OFFICE O/P NEW HI 60 MIN: CPT | Performed by: INTERNAL MEDICINE

## 2021-04-22 PROCEDURE — 85025 COMPLETE CBC W/AUTO DIFF WBC: CPT | Mod: ZL | Performed by: INTERNAL MEDICINE

## 2021-04-22 PROCEDURE — 82784 ASSAY IGA/IGD/IGG/IGM EACH: CPT | Mod: ZL | Performed by: INTERNAL MEDICINE

## 2021-04-22 PROCEDURE — 85732 THROMBOPLASTIN TIME PARTIAL: CPT | Mod: ZL | Performed by: INTERNAL MEDICINE

## 2021-04-22 PROCEDURE — 85270 CLOT FACTOR XI PTA: CPT | Mod: ZL | Performed by: INTERNAL MEDICINE

## 2021-04-22 PROCEDURE — 85610 PROTHROMBIN TIME: CPT | Mod: ZL | Performed by: INTERNAL MEDICINE

## 2021-04-22 PROCEDURE — 83615 LACTATE (LD) (LDH) ENZYME: CPT | Mod: ZL | Performed by: INTERNAL MEDICINE

## 2021-04-22 PROCEDURE — 85290 CLOT FACTOR XIII FIBRIN STAB: CPT | Mod: ZL | Performed by: INTERNAL MEDICINE

## 2021-04-22 PROCEDURE — 85250 CLOT FACTOR IX PTC/CHRSTMAS: CPT | Mod: ZL | Performed by: INTERNAL MEDICINE

## 2021-04-22 PROCEDURE — 85730 THROMBOPLASTIN TIME PARTIAL: CPT | Mod: ZL | Performed by: INTERNAL MEDICINE

## 2021-04-22 PROCEDURE — 85613 RUSSELL VIPER VENOM DILUTED: CPT | Mod: ZL | Performed by: INTERNAL MEDICINE

## 2021-04-22 PROCEDURE — 85280 CLOT FACTOR XII HAGEMAN: CPT | Mod: ZL | Performed by: INTERNAL MEDICINE

## 2021-04-22 RX ORDER — CHOLECALCIFEROL (VITAMIN D3) 50 MCG
2 TABLET ORAL DAILY
COMMUNITY

## 2021-04-22 ASSESSMENT — PAIN SCALES - GENERAL: PAINLEVEL: NO PAIN (0)

## 2021-04-22 ASSESSMENT — MIFFLIN-ST. JEOR: SCORE: 1246.23

## 2021-04-22 NOTE — NURSING NOTE
"Chief Complaint   Patient presents with     Consult     Elevated PTT       Initial BP 92/68   Pulse 80   Temp 98.4  F (36.9  C) (Tympanic)   Resp 16   Ht 1.651 m (5' 5\")   Wt 48.5 kg (107 lb)   LMP 03/30/2021 (Exact Date)   SpO2 99%   BMI 17.81 kg/m   Estimated body mass index is 17.81 kg/m  as calculated from the following:    Height as of this encounter: 1.651 m (5' 5\").    Weight as of this encounter: 48.5 kg (107 lb).  Medication Reconciliation: complete    Keily Mueller CMA (AAMA)  "

## 2021-04-23 NOTE — PROGRESS NOTES
Visit Date: 04/22/2021    REASON FOR CONSULTATION:  Epistaxis, menorrhagia, elevated PTT, rule out coagulopathy.    REFERRING PHYSICIAN:  Dr. Gonzales.    HISTORY OF PRESENT ILLNESS:  Mrs. Bailey is a 20-year-old white female with a history of anxiety, depression and menorrhagia whom we are asked to evaluate concerning epistaxis in the setting of elevated PTT.  According to the patient, she has been having menorrhagia for most of her life and was prescribed oral contraceptives, and this has helped to lessen them, but she still has heavy periods on a regular basis.  She also describes epistaxis over the winter months.  These have improved somewhat.  She denies any family history of bleeding diathesis or hematologic disorders.  She denies any easy bruisability.  She denies any gum bleeding after dental work.  She has had wisdom teeth removed without any serious bleeding.  She says the epistaxis is self-limited.  She was evaluated by Dr. Gonzales, who wendy some labs.  INR was normal, but PTT was elevated at 60.  Therefore, she is here to rule out coagulopathy.    She denies any bleeding episodes elsewhere.  No bright red blood per rectum, hematemesis, hematuria, bleeding after cuts.    PAST MEDICAL HISTORY:  Significant for anxiety, depression, iron deficiency, menorrhagia, contact dermatitis, hair loss.    ALLERGIES:  SHE HAS NO KNOWN DRUG ALLERGIES.    MEDICATIONS:  Medications she is currently on include:  1.  Celexa 40 mg daily.  2.  Alesse 1 tablet by mouth daily.  3.  Trazodone 50 mg p.o. at bedtime p.r.n. sleep.  4.  Multivitamins one tablet daily.  5. Vitamin D3 at 4000 units daily.    SOCIAL HISTORY:  Tobacco negative.  Alcohol is negative.  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  She is a student studying aviation studies.    FAMILY HISTORY:  Noncontributory.    REVIEW OF SYSTEMS:  CENTRAL NERVOUS SYSTEM:  Negative for headache, change in mental status.   ENT:   Negative for hearing loss.  She does have epistaxis on a regular basis.  RESPIRATORY:  Negative for shortness of breath, chest pain, hemoptysis.  CARDIAC:  Negative for chest pain, palpitations, orthopnea, PND, ankle edema.  GASTROINTESTINAL:  Negative for change in bowel habits or bright red blood per rectum, hematemesis, constipation.  MUSCULOSKELETAL:  Unremarkable.    GENITOURINARY:  Negative for nocturia, urgency, frequency, hematuria.    CONSTITUTIONAL:  Negative for fevers, night sweats, weight loss.   HEMATOLOGIC:  Significant for epistaxis.  No easy bruisability.   GYNECOLOGIC:  She does have menorrhagia.    PHYSICAL EXAMINATION:    GENERAL:  She is a thin, young white female in no acute distress.  ECOG performance status is 0.    VITAL SIGNS:  Reveal blood pressure 90/68, pulse 80, respirations 16, temperature 98.4.    HEENT:  Head is atraumatic and normocephalic.  Oropharynx nonerythematous.  NECK:  Supple.  CHEST:  Clear to auscultation and percussion.  HEART:  Regular rhythm, S1, S2 normal.  ABDOMEN:  Soft.  Normoactive bowel sounds.  No masses.  LYMPHATIC:  No cervical, supraclavicular, axillary or inguinal nodes.  EXTREMITIES:  No edema.  NEUROLOGIC:  Nonfocal.      LABORATORIES:  Reveal a PTT of 60.  INR is 1.13.  CBC within normal limits.    IMPRESSION AND PLAN:  Epistaxis menorrhagia in the setting of elevated PTT, rule out bleeding diathesis coagulopathy.  We will obtain factor VIII, IX, X, XI, XII, XIII levels as well as D-dimer, fibrinogen, lupus anticoagulant and also obtain von Willebrand profile to rule out underlying von Willebrand disease.  Repeat PTT, PT/INR.  Obtain serum protein electrophoresis.  Otherwise, we will see the patient after the above workup.    TIME SPENT:  Eighty minutes were spent with this patient.  Time was spent reviewing the chart, reviewing the literature on bleeding diathesis in the setting of elevated PTT, performing history and physical, documenting history and  physical, ordering labs and followup care.    Jamie Santacruz MD        D: 2021   T: 2021   MT: INDRA    Name:     CASH SANTIAGO  MRN:      [id number]        Account:    [id number]   :      2000           Visit Date: 2021     Document: [id number]    cc:  Charley Gonzales MD

## 2021-04-24 LAB
FACT IX ACT/NOR PPP: 108 % (ref 65–150)
FACT X ACT/NOR PPP: 120 % (ref 60–140)
FACT XI ACT/NOR PPP: 84 % (ref 65–150)
FACT XII ACT/NOR PPP: 140 % (ref 50–186)
THROMBIN TIME: 16.3 SEC (ref 13–19)

## 2021-04-26 LAB
ALBUMIN SERPL ELPH-MCNC: 4.4 G/DL (ref 3.7–5.1)
ALPHA1 GLOB SERPL ELPH-MCNC: 0.4 G/DL (ref 0.2–0.4)
ALPHA2 GLOB SERPL ELPH-MCNC: 0.7 G/DL (ref 0.5–0.9)
B-GLOBULIN SERPL ELPH-MCNC: 0.7 G/DL (ref 0.6–1)
FACT VIII ACT/NOR PPP: 70 % (ref 55–200)
GAMMA GLOB SERPL ELPH-MCNC: 0.9 G/DL (ref 0.7–1.6)
IGA SERPL-MCNC: 91 MG/DL (ref 84–499)
IGG SERPL-MCNC: 911 MG/DL (ref 610–1616)
IGM SERPL-MCNC: 288 MG/DL (ref 35–242)
M PROTEIN SERPL ELPH-MCNC: 0 G/DL
PROT PATTERN SERPL ELPH-IMP: NORMAL
PROT PATTERN SERPL IFE-IMP: ABNORMAL
VWF CBA/VWF AG PPP IA-RTO: 75 % (ref 50–200)
VWF:AC ACT/NOR PPP IA: 75 % (ref 50–180)

## 2021-04-27 LAB
FACT XIII AG ACT/NOR PPP IA: 79 % (ref 75–155)
LA PPP-IMP: NEGATIVE
VON WILLEBRAND INTERPRETATION: NORMAL
VWF MULTIMERS PPP QL: NORMAL

## 2021-05-14 ENCOUNTER — ONCOLOGY VISIT (OUTPATIENT)
Dept: ONCOLOGY | Facility: OTHER | Age: 21
End: 2021-05-14
Attending: INTERNAL MEDICINE
Payer: COMMERCIAL

## 2021-05-14 VITALS
HEART RATE: 95 BPM | DIASTOLIC BLOOD PRESSURE: 66 MMHG | TEMPERATURE: 98.4 F | SYSTOLIC BLOOD PRESSURE: 102 MMHG | OXYGEN SATURATION: 98 % | BODY MASS INDEX: 18.47 KG/M2 | RESPIRATION RATE: 18 BRPM | WEIGHT: 111 LBS

## 2021-05-14 DIAGNOSIS — R04.0 EPISTAXIS: Primary | ICD-10-CM

## 2021-05-14 PROCEDURE — 99215 OFFICE O/P EST HI 40 MIN: CPT | Performed by: INTERNAL MEDICINE

## 2021-05-14 ASSESSMENT — PAIN SCALES - GENERAL: PAINLEVEL: NO PAIN (0)

## 2021-05-14 NOTE — NURSING NOTE
"Chief Complaint   Patient presents with     RECHECK     Elevated PTT       Initial /66   Pulse 95   Temp 98.4  F (36.9  C) (Tympanic)   Resp 18   Wt 50.3 kg (111 lb)   LMP 04/27/2021 (Exact Date)   SpO2 98%   BMI 18.47 kg/m   Estimated body mass index is 18.47 kg/m  as calculated from the following:    Height as of 4/22/21: 1.651 m (5' 5\").    Weight as of this encounter: 50.3 kg (111 lb).  Medication Reconciliation: complete    Keily Mueller CMA (AAMA)  "

## 2021-05-14 NOTE — PROGRESS NOTES
Visit Date: 05/14/2021    HISTORY OF PRESENT ILLNESS:  Ms. Bailey returns for followup evaluation of elevated PTT in the setting of epistaxis and menorrhagia.  We had seen the patient on 04/22/2021 at the request of Dr. Gonzales.  At that time, Ms. Bailey was a 20-year-old white female with a history of anxiety, depression and menorrhagia who we were asked to evaluate concerning epistaxis in the setting of elevated PTT.  According to the patient, she had been having menorrhagia for most of her life and was prescribed oral contraceptives and this helped to lessen them.  Although she was still having some heavy periods, she also described epistaxis over the winter months.  These have improved somewhat.  She denies any family history of bleeding diathesis or hematologic disorder.  She denied any easy bruisability.  She denied any gum bleeding after dental work.  She had wisdom teeth removed without any serious bleeding.  She says the epistaxis was self-limited.  She was evaluated by Dr. Gonzales, who wendy some labs.  INR was normal, but PTT was elevated at 60.  Therefore, she was here to rule out coagulopathy.  When we saw her, we felt we should rule out bleeding diathesis.  We therefore ordered factor VIII, IX, X, XI, XII and XIII levels.  These were all normal.  We also obtained a D-dimer, which was normal.  Von Willebrand profile was normal.  Lupus anticoagulant was normal.  Repeat PTT was normal.  Serum protein electrophoresis was normal.  The patient states she is still having periodic episodes of epistaxis that are self-limited.  She has not seen an ENT physician for this.    PHYSICAL EXAMINATION:    GENERAL:  She is a young white female in no acute distress.  VITAL SIGNS:  Reveal blood pressure of 102/66, pulse 95, respirations 18, temperature 98.4.  HEENT:  Atraumatic, normocephalic.  Oropharynx nonerythematous.  NECK:  Supple.  LUNGS:  Clear to auscultation and percussion.  HEART:  Regular rhythm.   S1, S2 normal.  ABDOMEN:  Soft, normoactive bowel sounds, no masses, nontender.  BACK:  No cervical, supraclavicular, axillary or inguinal nodes.  EXTREMITIES:  Without edema.  NEUROLOGIC:  Nonfocal.    LABORATORY DATA:  As above.  CBC is within normal limits.  LFTs are normal.  BUN and creatinine normal.  LDH is normal.  PTT was 33.    IMPRESSION AND PLAN:  Epistaxis.  No evidence of bleeding diathesis or coagulopathy.  PTT is now normal.  INR is now normal.  We will refer the patient to ENT physician, Dr. Felice Beth for evaluation of epistaxis to rule out structural defect.  Otherwise, she can follow up with Dr. Charley Gonzales for general medical care.    Sixty-eight minutes were spent on this patient.  Time was spent reviewing lab work, performing history and physical, documenting history and physical and referring the patient to Dr. Felice Beth.    Jamie Santacruz MD        D: 2021   T: 2021   MT: JEMMT    Name:     CASH SANTIAGO  MRN:      [id number]        Account:    [id number]   :      2000           Visit Date: 2021     Document: [id number]    cc:  MD Felice Holder MD

## 2021-07-28 ENCOUNTER — THERAPY VISIT (OUTPATIENT)
Dept: CHIROPRACTIC MEDICINE | Facility: OTHER | Age: 21
End: 2021-07-28
Attending: CHIROPRACTOR
Payer: COMMERCIAL

## 2021-07-28 DIAGNOSIS — M54.50 BILATERAL LOW BACK PAIN WITHOUT SCIATICA, UNSPECIFIED CHRONICITY: ICD-10-CM

## 2021-07-28 DIAGNOSIS — M99.01 SEGMENTAL AND SOMATIC DYSFUNCTION OF CERVICAL REGION: ICD-10-CM

## 2021-07-28 DIAGNOSIS — M99.04 SEGMENTAL AND SOMATIC DYSFUNCTION OF SACRAL REGION: ICD-10-CM

## 2021-07-28 DIAGNOSIS — M99.02 SEGMENTAL AND SOMATIC DYSFUNCTION OF THORACIC REGION: ICD-10-CM

## 2021-07-28 DIAGNOSIS — M54.6 PAIN IN THORACIC SPINE: Primary | ICD-10-CM

## 2021-07-28 DIAGNOSIS — M54.2 CERVICALGIA: ICD-10-CM

## 2021-07-28 PROCEDURE — 99213 OFFICE O/P EST LOW 20 MIN: CPT | Mod: 25 | Performed by: CHIROPRACTOR

## 2021-07-28 PROCEDURE — 98941 CHIROPRACT MANJ 3-4 REGIONS: CPT | Mod: AT | Performed by: CHIROPRACTOR

## 2021-07-28 NOTE — PROGRESS NOTES
PATIENT:  Christy Bailey is a 21 year old female presenting for neck and back pain    PROBLEM:   Date of Initial Visit for this Episode:  7/28/2021     Visit #1    SUBJECTIVE / HPI: Patient presents to our office with primary complaints of neck and back pain.  Relates this to beginning a job several months ago.  States that she oftentimes will sit in a stool without any back support.  Notes that because of this she will oftentimes elevate her feet and sit in a forward hunched position for extended periods of time leaning on the desk.  Back and neck pain seems to have been progressively worsening following this.  No reported loss of bowel or bladder function, no reported lower or upper extremity radiculopathy.  Description and onset:  Duration and Frequency of Pain: several months and constantly achey and tight  Radiation of pain: no  Pain rated at it's worst: neck 4/10, midback 2/10, lower back 4/10  Pain rated currently:  neck 4/10, midback 2/10, lower back 4/10  Pain course: Gradually getting worse  Worse with:  Nothing specific  Improved by:  repositioning  Additional Features: unremarkable  Other Health Care Providers seen for this: unremarkable  Previous treatment: unremarkable  Previous injury:unremarkable      See flowsheets in chart for details.  7/28/2021   Neck Disability Index (  Mannie H. and Samantha C. 1991. All rights reserved.; used with permission) 7/28/2021   SECTION 1 - PAIN INTENSITY 1   SECTION 2 - PERSONAL CARE 0   SECTION 3 - LIFTING 0   SECTION 4 - READING 3   SECTION 5 - HEADACHES 1   SECTION 6 - CONCENTRATION 0   SECTION 7 - WORK 2   SECTION 8 - DRIVING 0   SECTION 9 - SLEEPING 1   SECTION 10 - RECREATION 2   Count 10   Sum 10   Raw Score: /50 10   Neck Disability Index Score: (%) 20     Oswestry (SHELLIE) Questionnaire    OSWESTRY DISABILITY INDEX 7/28/2021   Count 9   Sum 6   Oswestry Score (%) 13.33   Some recent data might be hidden      Functional limitations:  Cervical flexion  (reading) working also affected    Past D.C. Care: no    PAST MEDICAL HISTORY:  Past Medical History:   Diagnosis Date     Type O blood, Rh positive        PAST SURGICAL HISTORY:  History reviewed. No pertinent surgical history.    ALLERGIES:  No Known Allergies    CURRENT MEDICATIONS:  Current Outpatient Medications   Medication Sig Dispense Refill     citalopram (CELEXA) 40 MG tablet Take 1 tablet (40 mg) by mouth daily 90 tablet 3     Multiple Vitamin (MULTI-VITAMINS) TABS Take 1 tablet by mouth daily       norgestrel-ethinyl estradiol (ELINEST) 0.3-30 MG-MCG tablet Take 1 tablet by mouth daily 84 tablet 3     order for DME SAD light.  Diagnosis:  F33.9, F41.8.  Length of need:  99.       traZODone (DESYREL) 50 MG tablet Take 1 tablet (50 mg) by mouth At Bedtime 90 tablet 3     vitamin D3 (CHOLECALCIFEROL) 50 mcg (2000 units) tablet Take 2 tablets by mouth daily         SOCIAL HISTORY:  Marital Status: single (never ).  Children: no.  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  Alcohol use:yes.  Tobacco use: Smoker: no.      FAMILY HISTORY:  Family History   Problem Relation Age of Onset     Other - See Comments Mother         GI Disease,ulcerative colitis     Allergy (Severe) Mother         Allergies,seasonal     Asthma Mother         Asthma     Family History Negative Father         Good Health     Other - See Comments Paternal Grandfather         sarcoidosis     Ovarian Cancer Paternal Grandmother 52        Cancer-ovarian, of ovarian cancer. diabetes on her side of family     Other - See Comments Brother         complicated  course.  See chart for details.     Family History Negative Sister         Good Health       Patient Active Problem List   Diagnosis     Contact dermatitis and eczema     Iron deficiency     Menorrhagia with regular cycle     Murmur     Anxiety and depression     Hair loss         ROS:  The patient denies any fevers, chills, nausea, vomiting, diarrhea, constipation,dysuria,  hematuria, or urinary hesitancy or incontinence.  No shortness of breath, chest pain, or rashes.    OBJECTIVE:    DIAGNOSTICS:  No current spinal imaging taken.     PHYSICAL EXAM:     GENERAL APPEARANCE: healthy, alert, no distress and cooperative   GAIT: NORMAL      MUSCULOSKELETAL:   Posture: Anterior head carriage, rounded shoulders.  Low left iliac crest, low right shoulder, low left occiput  Gait:  unremarkable.     Cervical performed actively, measured approximately  ROM:   smooth/halting arc of motion   45/50 flexion    45/45 extension    45/45 RLF  40/45 LLF    80/85 RR         75/85 LR       -Maximal Foraminal Compression: -focal mild neck pain.   -Distraction improves      Thoracic and Lumbar performed actively, measured approximately  ROM:  60/60 flexion 60/60 extension    45/45 RLF    45/45 LLF   35/45 RR      45/45 LR    +Kemps: Mid thoracic spine, intrascapular T-spine, left PSIS  - Straight leg raise  +Leg length inequality: negativeDerefield + on right ; Restricted right heel to buttock   Other: Ely's:- right, - left    +Tenderness: Suboccipital ridge bilaterally, CT junction midline, T4 midline, T10 midline, left PSIS  +Muscle spasm: Suboccipitals bilaterally, SCM's bilaterally, upper trapezius and levator scapula bilaterally, left quadratus lumborum.  Taut tender fibers noted throughout paraspinal musculature cervical, thoracic, lumbar regions bilaterally  +Joint asymmetry and restriction: C1 right lateral flexion left rotation restriction, C2 extension left lateral flexion restriction, T1 extension restriction, T4 extension restriction, T10 extension restriction, sacrum extension left lateral flexion restriction    ASSESSMENT: Christy Bailey is a 21 year old female presenting with primary complaints of neck and back pain.  Evidence present of segmental/somatic dysfunction throughout cervical, thoracic, lumbar pelvic spinal regions consistent with patient's symptoms.  Patient does appear to  be an excellent candidate for chiropractic care and I do anticipate a febrile response with treatment.  No contraindications precluding patient from receiving care today.  As patient has not been under chiropractic care before did inform her of techniques to be utilized as well as the fact that she might be somewhat sore after treatment.  I also informed the patient that this is quite normal and will resolve within a day or so.  All questions answered prior to providing treatment to patient satisfaction.     1. Pain in thoracic spine    2. Segmental and somatic dysfunction of thoracic region    3. Cervicalgia    4. Segmental and somatic dysfunction of cervical region    5. Bilateral low back pain without sciatica, unspecified chronicity    6. Segmental and somatic dysfunction of sacral region        PLAN    Evaluation and Management:  19862 Moderate exam established patient 20 min    Procedures:  Modalities:  44618: MSTM:  To Sub-occipital  for 2 min    CMT:  49897 Chiropractic manipulative treatment 3-4 regions performed   Cervical: Diversified, C1 , C2, Supine  Thoracic: Diversified, T1, T4, T10, Prone  Pelvis: Diversified, Sacrum , Side posture    Therapeutic procedures:  Reach and roll  Child's pose  Cat backs    Response to Treatment  Reduction in symptoms as reported by patient    Prognosis: Excellent    7/28/2021 Plan of Care:  4-6 visits of Chiropractic Care including Spinal Adjustments and/or physiotherapy and active rehabilitation, to include exercises in the office and/or at home to meet care plan goals.     Frequency: 2xweek for up to 4 weeks. A reevaluation would be clinically appropriate in 6visits, to determine progress and further course of care.    POC discussed and patient agreeable to plan of care.      7/28/2021 Goals:      Patient will report improved pain.   Patient will report able to read without painful limits and work without painful limits.   Patient will demonstrate proper use of home  care exercises.   Patient will demonstrate an improved ability to complete Activities of Daily Living  as shown by a reported 10-30% reduced score on neck and/or back index.    Patient will demonstrate improved ROM.        INSTRUCTIONS   ice 20 minutes every other hour as needed, heat 15 minutes every other hour as needed and stretch as instructed at visit    Follow-up:  Return to care in 1 week.        Disclaimer: This note consists of symbols derived from keyboarding, dictation and/or voice recognition software. As a result, there may be errors in the script that have gone undetected. Please consider this when interpreting information found in this chart.

## 2021-08-17 ENCOUNTER — OFFICE VISIT (OUTPATIENT)
Dept: OTOLARYNGOLOGY | Facility: OTHER | Age: 21
End: 2021-08-17
Attending: OTOLARYNGOLOGY
Payer: COMMERCIAL

## 2021-08-17 DIAGNOSIS — Z87.898 HISTORY OF EPISTAXIS: Primary | ICD-10-CM

## 2021-08-17 PROCEDURE — G0463 HOSPITAL OUTPT CLINIC VISIT: HCPCS

## 2021-08-17 NOTE — PROGRESS NOTES
document embedded image  Patient Name: Christy Bailey    Address: [place] [address]     YOB: 2000    [place] [address]    MR Number: [id number]    Phone: [phone]  PCP: Charley Gonzales MD            Appointment Date: 08/17/21   Visit Provider: Felice Beth MD    cc: Charley Gonzales MD; ~    ENT Progress Note  Visit Reasons: Frequent nose bleeds / No thinners    HPI  History of Present Illness  Chief complaint:  Recurrent epistaxis    History  The patient is a 21-year-old female who has noticed recurring difficulties with nosebleeds particularly in the winter months.  She really is not had much bleeding this summer.  She is on no blood thinners or platelet inhibitors.  She is not a regular nose blower.  She has had no easy bruising or bleeding elsewhere.    Exam  Nasal-there is a area of maceration posteriorly and inferiorly in Kiesselbach's plexus on the right.  The left nasal septum is intact.  The remainder of the nasal passages are clear  Neck-no masses or adenopathy  Oral cavity oropharynx-free of lesions or inflammation  Head neck integument-otherwise clear  The external auditory canals and TMs are unremarkable  General-the patient appears well and in no distress  Neuro-there are no focal cranial nerve deficits    Home Medications     - Last Reconciled 08/18/21 by Delisa Solis, Med Assist    cholecalciferol (vitamin D3)   PO  citalopram   PO  multivitamin (Daily Multivitamin)  PO  trazodone   PO PRN    Allergies    No Known Allergies Allergy (Unverified 08/18/21 11:53)    PFSH  PFSH:     Medical History (Updated 08/18/21 @ 11:53 by Delisa Solis Med Assist)    Depression     Surgical History (Updated 08/18/21 @ 11:53 by Delisa Solis, Med Assist)    History of wisdom tooth extraction     Family History (Updated 08/18/21 @ 11:54 by Delisa Solis Med Assist)  Other  Asthma  Cancer  Coronary artery disease  Depression  Diabetes mellitus  Headache  Hearing  loss  Hypertension       Social History (Updated 08/18/21 @ 11:54 by Delisa Solis, Med Assist)  Smoking Status:  Never smoker       A&P  Assessment & Plan  (1) History of epistaxis:        Status: Acute        Code(s):  Z87.898 - Personal history of other specified conditions  Additional Plan Details  Plan Details: She was counseled on local cares regarding her nose to minimize nose bleeds in the future.  She is welcome to call if she develops recurring bleeds that failed to respond to conservative therapy.      Felice Beth MD    08/17/21 0831    <Electronically signed by Felice Beth MD> 08/18/21 1306

## 2021-09-13 ENCOUNTER — THERAPY VISIT (OUTPATIENT)
Dept: CHIROPRACTIC MEDICINE | Facility: OTHER | Age: 21
End: 2021-09-13
Attending: CHIROPRACTOR
Payer: COMMERCIAL

## 2021-09-13 DIAGNOSIS — M54.6 PAIN IN THORACIC SPINE: Primary | ICD-10-CM

## 2021-09-13 DIAGNOSIS — M54.2 CERVICALGIA: ICD-10-CM

## 2021-09-13 DIAGNOSIS — M99.02 SEGMENTAL AND SOMATIC DYSFUNCTION OF THORACIC REGION: ICD-10-CM

## 2021-09-13 DIAGNOSIS — M99.03 SEGMENTAL AND SOMATIC DYSFUNCTION OF LUMBAR REGION: ICD-10-CM

## 2021-09-13 DIAGNOSIS — M99.01 SEGMENTAL AND SOMATIC DYSFUNCTION OF CERVICAL REGION: ICD-10-CM

## 2021-09-13 DIAGNOSIS — M54.50 BILATERAL LOW BACK PAIN WITHOUT SCIATICA, UNSPECIFIED CHRONICITY: ICD-10-CM

## 2021-09-13 PROCEDURE — 98941 CHIROPRACT MANJ 3-4 REGIONS: CPT | Mod: AT | Performed by: CHIROPRACTOR

## 2021-09-13 NOTE — PROGRESS NOTES
Visit #:  2    Subjective:  Christy Bailey is a 21 year old female who is seen in f/u up for:        Pain in thoracic spine  Segmental and somatic dysfunction of thoracic region  Cervicalgia  Segmental and somatic dysfunction of cervical region  Segmental and somatic dysfunction of lumbar region  Bilateral low back pain without sciatica, unspecified chronicity.     Since last visit on 7/28/2021,  Christy Bailey reports: Following initial visit patient did not follow-up with her office as originally planned and symptoms felt quite good.  States that after approximately 1 week symptoms progressively worsened to current levels.  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  Believes this may be contributing to symptoms.  No reported upper or lower extremity radicular symptoms, no traumatic events contributing to aggravation at this time.  Reports doing home exercises which she seems to find relief from her symptoms    Area of chief complaint:  Thoracic :  Symptoms are graded at 5/10. The quality is described as intermittently tense.    Lumbar :  Symptoms are graded at 3-5/10. The quality is described as constantly tense.    Cervical :  Symptoms are graded at 4-6/10. The quality is described as constantly tight.       Objective:  The following was observed:  Neck Disability Index (  Mannie H. and Samantha C. 1991. All rights reserved.; used with permission) 9/13/2021   SECTION 1 - PAIN INTENSITY 2   SECTION 2 - PERSONAL CARE 0   SECTION 3 - LIFTING 0   SECTION 4 - READING 1   SECTION 5 - HEADACHES 3   SECTION 6 - CONCENTRATION 0   SECTION 7 - WORK 1   SECTION 8 - DRIVING 0   SECTION 9 - SLEEPING 1   SECTION 10 - RECREATION 2   Count 10   Sum 10   Raw Score: /50 10   Neck Disability Index Score: (%) 20     Oswestry (SHELLIE) Questionnaire    OSWESTRY DISABILITY INDEX 9/13/2021   Count 9   Sum 5   Oswestry Score (%) 11.11   Some recent  data might be hidden          P: palpatory tendernessSuboccipital ridge bilaterally, left CT junction, mid thoracic spine midline, T12 midline, left side L5:    A: static palpation demonstrates intersegmental asymmetry , cervical, thoracic, lumbar  R: motion palpation notes restricted motion, C1 , C2 , T2 , T5 , T8 , T12  and L5   T: {Cervical paraspinal musculature, suboccipital musculature, upper trapezius, levator scapula  Left quadratus lumborum    Segmental spinal dysfunction/restrictions found at:  :  C1 Left rotation restricted and Right lateral flexion restricted  C2 Left lateral flexion restricted and Extension restriction  T2 Left lateral flexion restricted and Extension restriction  T5 Right lateral flexion restricted and Extension restriction  T8 Extension restriction  T12 Extension restriction  L5 Left lateral flexion restricted and Extension restriction.      Assessment: Reaggravation symptoms from initial visit on July 28, 2021.  Continue to follow-up with patient as needed at this time.    Diagnoses:      1. Pain in thoracic spine    2. Segmental and somatic dysfunction of thoracic region    3. Cervicalgia    4. Segmental and somatic dysfunction of cervical region    5. Segmental and somatic dysfunction of lumbar region    6. Bilateral low back pain without sciatica, unspecified chronicity        Patient's condition:  Patient had restrictions pre-manipulation    Treatment effectiveness:  Post manipulation there is better intersegmental movement and Patient states that they feel much better post manipulation but they gradually tighten up over time      Procedures:  CMT:  56877 Chiropractic manipulative treatment 3-4 regions performed   Cervical: Diversified, C1 , C2, Supine  Thoracic: Diversified, T2, T5, T8, T12, Prone  Lumbar: Diversified, L5, Side posture    Modalities:  None performed this visit    Therapeutic procedures:  None    Response to Treatment  Reduction in symptoms as reported by  patient    Prognosis: Good    Progress towards Goals: Patient is making progress towards the goal.     Recommendations:    Instructions:stretch as instructed at visit    Follow-up:  Continue treatment PRN.

## 2021-10-09 ENCOUNTER — HEALTH MAINTENANCE LETTER (OUTPATIENT)
Age: 21
End: 2021-10-09

## 2021-11-20 ENCOUNTER — ALLIED HEALTH/NURSE VISIT (OUTPATIENT)
Dept: FAMILY MEDICINE | Facility: OTHER | Age: 21
End: 2021-11-20
Attending: FAMILY MEDICINE
Payer: COMMERCIAL

## 2021-11-20 DIAGNOSIS — G44.209 TENSION-TYPE HEADACHE, NOT INTRACTABLE, UNSPECIFIED CHRONICITY PATTERN: ICD-10-CM

## 2021-11-20 DIAGNOSIS — R53.83 FATIGUE, UNSPECIFIED TYPE: Primary | ICD-10-CM

## 2021-11-20 PROCEDURE — U0003 INFECTIOUS AGENT DETECTION BY NUCLEIC ACID (DNA OR RNA); SEVERE ACUTE RESPIRATORY SYNDROME CORONAVIRUS 2 (SARS-COV-2) (CORONAVIRUS DISEASE [COVID-19]), AMPLIFIED PROBE TECHNIQUE, MAKING USE OF HIGH THROUGHPUT TECHNOLOGIES AS DESCRIBED BY CMS-2020-01-R: HCPCS | Mod: ZL

## 2021-11-20 PROCEDURE — C9803 HOPD COVID-19 SPEC COLLECT: HCPCS

## 2021-11-20 NOTE — NURSING NOTE
Patient swabbed for COVID-19 testing for headache and fatigue.  Kasia Fernandez LPN on 11/20/2021 at 2:50 PM

## 2021-11-22 LAB — SARS-COV-2 RNA RESP QL NAA+PROBE: NEGATIVE

## 2021-11-28 ENCOUNTER — ALLIED HEALTH/NURSE VISIT (OUTPATIENT)
Dept: FAMILY MEDICINE | Facility: OTHER | Age: 21
End: 2021-11-28
Attending: FAMILY MEDICINE
Payer: COMMERCIAL

## 2021-11-28 DIAGNOSIS — Z78.9 PATIENT TRAVELS: Primary | ICD-10-CM

## 2021-11-28 PROCEDURE — C9803 HOPD COVID-19 SPEC COLLECT: HCPCS

## 2021-11-28 PROCEDURE — U0005 INFEC AGEN DETEC AMPLI PROBE: HCPCS | Mod: ZL

## 2021-11-30 LAB — SARS-COV-2 RNA RESP QL NAA+PROBE: NEGATIVE

## 2022-02-16 DIAGNOSIS — N92.0 MENORRHAGIA WITH REGULAR CYCLE: ICD-10-CM

## 2022-02-16 NOTE — TELEPHONE ENCOUNTER
"Routing to provider as directed. Margaret Resendez RN on 2/16/2022 at 8:37 AM    Last Prescription Date: 3/23/21  Last Qty/Refills: 84 / 3  Last Office Visit: 3/23/21  Future Office Visit: 3/4/22     Requested Prescriptions   Pending Prescriptions Disp Refills     ELINEST 0.3-30 MG-MCG tablet [Pharmacy Med Name: ELINEST 28-DAY TABLET] 84 tablet 2     Sig: TAKE 1 TABLET BY MOUTH DAILY       Contraceptives Protocol Passed - 2/16/2022  1:00 AM        Passed - Patient is not a current smoker if age is 35 or older        Passed - Recent (12 mo) or future (30 days) visit within the authorizing provider's specialty     Patient has had an office visit with the authorizing provider or a provider within the authorizing providers department within the previous 12 mos or has a future within next 30 days. See \"Patient Info\" tab in inbasket, or \"Choose Columns\" in Meds & Orders section of the refill encounter.              Passed - Medication is active on med list        Passed - No active pregnancy on record        Passed - No positive pregnancy test in past 12 months             "

## 2022-02-17 RX ORDER — NORGESTREL AND ETHINYL ESTRADIOL 0.3-0.03MG
1 KIT ORAL DAILY
Qty: 84 TABLET | Refills: 0 | Status: SHIPPED | OUTPATIENT
Start: 2022-02-17 | End: 2022-03-04

## 2022-03-04 ENCOUNTER — OFFICE VISIT (OUTPATIENT)
Dept: FAMILY MEDICINE | Facility: OTHER | Age: 22
End: 2022-03-04
Attending: FAMILY MEDICINE
Payer: COMMERCIAL

## 2022-03-04 VITALS
HEART RATE: 83 BPM | BODY MASS INDEX: 18.47 KG/M2 | OXYGEN SATURATION: 98 % | DIASTOLIC BLOOD PRESSURE: 72 MMHG | SYSTOLIC BLOOD PRESSURE: 106 MMHG | RESPIRATION RATE: 16 BRPM | WEIGHT: 111 LBS | TEMPERATURE: 98.2 F

## 2022-03-04 DIAGNOSIS — L30.9 ECZEMA, UNSPECIFIED TYPE: ICD-10-CM

## 2022-03-04 DIAGNOSIS — N92.0 MENORRHAGIA WITH REGULAR CYCLE: ICD-10-CM

## 2022-03-04 DIAGNOSIS — F41.9 ANXIETY AND DEPRESSION: Primary | ICD-10-CM

## 2022-03-04 DIAGNOSIS — F32.A ANXIETY AND DEPRESSION: Primary | ICD-10-CM

## 2022-03-04 PROCEDURE — 99213 OFFICE O/P EST LOW 20 MIN: CPT | Performed by: FAMILY MEDICINE

## 2022-03-04 RX ORDER — TRAZODONE HYDROCHLORIDE 50 MG/1
50 TABLET, FILM COATED ORAL AT BEDTIME
Qty: 90 TABLET | Refills: 3 | Status: CANCELLED | OUTPATIENT
Start: 2022-03-04

## 2022-03-04 RX ORDER — TRIAMCINOLONE ACETONIDE 1 MG/ML
LOTION TOPICAL 2 TIMES DAILY
Qty: 45 ML | Refills: 3 | Status: SHIPPED | OUTPATIENT
Start: 2022-03-04 | End: 2022-03-18

## 2022-03-04 RX ORDER — CITALOPRAM HYDROBROMIDE 40 MG/1
40 TABLET ORAL DAILY
Qty: 90 TABLET | Refills: 3 | Status: SHIPPED | OUTPATIENT
Start: 2022-03-04 | End: 2022-09-01

## 2022-03-04 RX ORDER — NORGESTREL AND ETHINYL ESTRADIOL 0.3-0.03MG
1 KIT ORAL DAILY
Qty: 84 TABLET | Refills: 0 | Status: SHIPPED | OUTPATIENT
Start: 2022-03-04 | End: 2022-07-25

## 2022-03-04 ASSESSMENT — ANXIETY QUESTIONNAIRES
6. BECOMING EASILY ANNOYED OR IRRITABLE: NOT AT ALL
5. BEING SO RESTLESS THAT IT IS HARD TO SIT STILL: NOT AT ALL
GAD7 TOTAL SCORE: 1
4. TROUBLE RELAXING: NOT AT ALL
7. FEELING AFRAID AS IF SOMETHING AWFUL MIGHT HAPPEN: NOT AT ALL
3. WORRYING TOO MUCH ABOUT DIFFERENT THINGS: NOT AT ALL
1. FEELING NERVOUS, ANXIOUS, OR ON EDGE: SEVERAL DAYS
GAD7 TOTAL SCORE: 1
2. NOT BEING ABLE TO STOP OR CONTROL WORRYING: NOT AT ALL
7. FEELING AFRAID AS IF SOMETHING AWFUL MIGHT HAPPEN: NOT AT ALL
GAD7 TOTAL SCORE: 1

## 2022-03-04 ASSESSMENT — ENCOUNTER SYMPTOMS
FEVER: 0
COUGH: 0
NERVOUS/ANXIOUS: 0
SHORTNESS OF BREATH: 0
CHILLS: 0

## 2022-03-04 ASSESSMENT — PATIENT HEALTH QUESTIONNAIRE - PHQ9
SUM OF ALL RESPONSES TO PHQ QUESTIONS 1-9: 7
SUM OF ALL RESPONSES TO PHQ QUESTIONS 1-9: 7
10. IF YOU CHECKED OFF ANY PROBLEMS, HOW DIFFICULT HAVE THESE PROBLEMS MADE IT FOR YOU TO DO YOUR WORK, TAKE CARE OF THINGS AT HOME, OR GET ALONG WITH OTHER PEOPLE: SOMEWHAT DIFFICULT

## 2022-03-04 ASSESSMENT — PAIN SCALES - GENERAL: PAINLEVEL: MILD PAIN (3)

## 2022-03-04 NOTE — NURSING NOTE
Chief Complaint   Patient presents with     Recheck Medication     See assessments.     Medication Reconciliation: complete    Fern Fernando LPN

## 2022-03-04 NOTE — PROGRESS NOTES
SUBJECTIVE:   Nursing Notes:   Fern Fernando LPN  3/4/2022  9:54 AM  Sign at exiting of workspace  Chief Complaint   Patient presents with     Recheck Medication     See assessments.     Medication Reconciliation: complete    Fern Fernando LPN        Christy Bailey is a 21 year old female who presents to clinic today for refill of her medications.  She has been working at the  since April.  She works from 3-11 pm.  She has been working a lot since they are short staffed.  In August, she is planning to move to [place].  She is hoping to go to [employer] there and work and have a roommate. This will be [place], [place].  She is now down to seeing her therapist once a month.    She has been on oral contraceptives to help improve heavy periods.  She has been able to donate blood and her hemoglobin has been in good range.    Her eczema on her hands is worse lately.  She has a steroid cream, but hasn't been using it recently.  She has been using aquaphor usually at least once a day.    History of Present Illness       Mental Health Follow-up:  Patient presents to follow-up on Depression.Patient's depression since last visit has been:  Good  The patient is having other symptoms associated with depression.      Any significant life events: job concerns  Patient is not feeling anxious or having panic attacks.  Patient has no concerns about alcohol or drug use.     Social History  Tobacco Use    Smoking status: Never Smoker    Smokeless tobacco: Never Used  Alcohol use: Yes    Comment: 1 per month  Drug use: No      Today's PHQ-9         PHQ-9 Total Score:     (P) 7   PHQ-9 Q9 Thoughts of better off dead/self-harm past 2 weeks :   (P) Not at all   Thoughts of suicide or self harm:      Self-harm Plan:        Self-harm Action:          Safety concerns for self or others:         Reason for visit:  General medication check, eczema on hands review    She eats 2-3 servings of fruits and vegetables  daily.She consumes 2 sweetened beverage(s) daily.She exercises with enough effort to increase her heart rate 20 to 29 minutes per day.  She exercises with enough effort to increase her heart rate 3 or less days per week.   She is taking medications regularly.      I personally reviewed medications/allergies/history listed below:    Patient Active Problem List    Diagnosis Date Noted     Hair loss 2018     Priority: Medium     Anxiety and depression 2018     Priority: Medium     Murmur 2018     Priority: Medium     Overview:   Still's murmur.       Iron deficiency 2017     Priority: Medium     Menorrhagia with regular cycle 2017     Priority: Medium     Contact dermatitis and eczema 2012     Priority: Medium     Past Medical History:   Diagnosis Date     Type O blood, Rh positive       No past surgical history on file.  Family History   Problem Relation Age of Onset     Other - See Comments Mother         GI Disease,ulcerative colitis     Allergy (Severe) Mother         Allergies,seasonal     Asthma Mother         Asthma     Family History Negative Father         Good Health     Other - See Comments Paternal Grandfather         sarcoidosis     Ovarian Cancer Paternal Grandmother 52        Cancer-ovarian, of ovarian cancer. diabetes on her side of family     Other - See Comments Brother         complicated  course.  See chart for details.     Family History Negative Sister         Good Health     Social History     Tobacco Use     Smoking status: Never Smoker     Smokeless tobacco: Never Used   Substance Use Topics     Alcohol use: Yes     Comment: 1 per month     Social History     Social History Narrative    She lives with mother (Diana), father (Adonay), one sister, and one brother.  She is the oldest child.      Mom Diana.      Dad Adonay.      Sister Temo (Nina).  Date of birth:  00.      Brother Maynor.  Date of birth:  02.      Attended College  [employer].  Pursuing a degree in Asian studies with a focus on Setswana culture.    She is finishing her [employer] degree in spring 2021.     Current Outpatient Medications   Medication Sig Dispense Refill     citalopram (CELEXA) 40 MG tablet Take 1 tablet (40 mg) by mouth daily 90 tablet 3     Multiple Vitamin (MULTI-VITAMINS) TABS Take 1 tablet by mouth daily       norgestrel-ethinyl estradiol (ELINEST) 0.3-30 MG-MCG tablet Take 1 tablet by mouth daily 84 tablet 0     order for DME SAD light.  Diagnosis:  F33.9, F41.8.  Length of need:  99.       traZODone (DESYREL) 50 MG tablet Take 1 tablet (50 mg) by mouth At Bedtime 90 tablet 3     triamcinolone (KENALOG) 0.1 % external lotion Apply topically 2 times daily for 14 days 45 mL 3     vitamin D3 (CHOLECALCIFEROL) 50 mcg (2000 units) tablet Take 2 tablets by mouth daily       No Known Allergies    Review of Systems   Constitutional: Negative for chills and fever.   Respiratory: Negative for cough and shortness of breath.    Cardiovascular: Negative for peripheral edema.   Skin: Positive for rash.   Psychiatric/Behavioral: Negative for mood changes. The patient is not nervous/anxious.         OBJECTIVE:     /72 (BP Location: Right arm, Patient Position: Sitting, Cuff Size: Adult Regular)   Pulse 83   Temp 98.2  F (36.8  C) (Tympanic)   Resp 16   Wt 50.3 kg (111 lb)   LMP 03/03/2022   SpO2 98%   Breastfeeding No   BMI 18.47 kg/m    Body mass index is 18.47 kg/m .  Physical Exam  Constitutional:       Appearance: Normal appearance.   HENT:      Head: Normocephalic.      Right Ear: Tympanic membrane normal.      Left Ear: Tympanic membrane normal.   Eyes:      Extraocular Movements: Extraocular movements intact.      Pupils: Pupils are equal, round, and reactive to light.   Cardiovascular:      Rate and Rhythm: Normal rate and regular rhythm.      Pulses: Normal pulses.      Heart sounds: Normal heart sounds. No murmur heard.  Pulmonary:      Effort:  Pulmonary effort is normal.      Breath sounds: Normal breath sounds. No wheezing, rhonchi or rales.   Musculoskeletal:      Cervical back: Normal range of motion and neck supple.      Right lower leg: No edema.      Left lower leg: No edema.   Lymphadenopathy:      Cervical: No cervical adenopathy.   Skin:     Findings: Rash (eczematous changes on her hands bilaterally) present.   Neurological:      Mental Status: She is alert.   Psychiatric:         Mood and Affect: Mood normal.         Behavior: Behavior normal.           PHQ-9 SCORE 3/3/2020 3/23/2021 3/4/2022   PHQ-9 Total Score MyChart - - 7 (Mild depression)   PHQ-9 Total Score 10 13 7       PHQ-2 Score:     PHQ-2 ( 1999 Pfizer) 3/4/2022 3/4/2022   Q1: Little interest or pleasure in doing things - -   Q2: Feeling down, depressed or hopeless - -   PHQ-2 Score - -   PHQ-2 Total Score (12-17 Years)- Positive if 3 or more points; Administer PHQ-A if positive - -   PHQ-2 Score Incomplete Incomplete       TOMMY-7 SCORE 3/3/2020 3/23/2021 3/4/2022   Total Score - - 1 (minimal anxiety)   Total Score 5 5 1           I personally reviewed results withpatient as listed below:   Diagnostic Test Results:  none     ASSESSMENT/PLAN:       ICD-10-CM    1. Anxiety and depression  F41.9 citalopram (CELEXA) 40 MG tablet    F32.A    2. Menorrhagia with regular cycle  N92.0 norgestrel-ethinyl estradiol (ELINEST) 0.3-30 MG-MCG tablet   3. Eczema, unspecified type  L30.9 triamcinolone (KENALOG) 0.1 % external lotion       1.  Stable.  Continue on current dose of citalopram, 40 mg daily.  This is refilled for her.  2.  Stable.  Improved with oral contraceptives.  These are refilled.  She declines recheck of her labs at this time.  3.  She has not been using topical steroids recently.  Recommend using triamcinolone twice daily x 14 days along with emollient creams such as aquaphor.  Follow up if needed.    Charley Gonzales MD  [facility]        Answers for  HPI/ROS submitted by the patient on 3/4/2022  If you checked off any problems, how difficult have these problems made it for you to do your work, take care of things at home, or get along with other people?: Somewhat difficult  PHQ9 TOTAL SCORE: 7  TOMMY 7 TOTAL SCORE: 1

## 2022-03-05 ASSESSMENT — PATIENT HEALTH QUESTIONNAIRE - PHQ9: SUM OF ALL RESPONSES TO PHQ QUESTIONS 1-9: 7

## 2022-03-05 ASSESSMENT — ANXIETY QUESTIONNAIRES: GAD7 TOTAL SCORE: 1

## 2022-05-21 ENCOUNTER — HEALTH MAINTENANCE LETTER (OUTPATIENT)
Age: 22
End: 2022-05-21

## 2022-07-25 DIAGNOSIS — N92.0 MENORRHAGIA WITH REGULAR CYCLE: ICD-10-CM

## 2022-07-25 RX ORDER — NORGESTREL-ETHINYL ESTRADIOL 0.3-0.03MG
TABLET ORAL
Qty: 84 TABLET | Refills: 0 | Status: SHIPPED | OUTPATIENT
Start: 2022-07-25 | End: 2022-09-01

## 2022-07-25 NOTE — TELEPHONE ENCOUNTER
[facility] #[id number] sent Rx request for the following:      CRYSELLE 0.3MG-0.03MG TABLET      Last Prescription Date:   3/4/2022  Last Fill Qty/Refills:         84, R-0    Last Office Visit:              3/4/2022   Future Office visit:           9/1/2022    Chris Zuleta RN, BSN  ....................  7/25/2022   3:57 PM

## 2022-09-01 ENCOUNTER — OFFICE VISIT (OUTPATIENT)
Dept: FAMILY MEDICINE | Facility: OTHER | Age: 22
End: 2022-09-01
Attending: FAMILY MEDICINE
Payer: COMMERCIAL

## 2022-09-01 ENCOUNTER — MYC MEDICAL ADVICE (OUTPATIENT)
Dept: FAMILY MEDICINE | Facility: OTHER | Age: 22
End: 2022-09-01

## 2022-09-01 VITALS
BODY MASS INDEX: 18.23 KG/M2 | OXYGEN SATURATION: 93 % | HEIGHT: 66 IN | DIASTOLIC BLOOD PRESSURE: 70 MMHG | SYSTOLIC BLOOD PRESSURE: 114 MMHG | HEART RATE: 82 BPM | RESPIRATION RATE: 16 BRPM | WEIGHT: 113.4 LBS | TEMPERATURE: 98.6 F

## 2022-09-01 DIAGNOSIS — F41.9 ANXIETY AND DEPRESSION: ICD-10-CM

## 2022-09-01 DIAGNOSIS — Z00.00 HEALTH CARE MAINTENANCE: Primary | ICD-10-CM

## 2022-09-01 DIAGNOSIS — Z11.3 ROUTINE SCREENING FOR STI (SEXUALLY TRANSMITTED INFECTION): ICD-10-CM

## 2022-09-01 DIAGNOSIS — F32.A ANXIETY AND DEPRESSION: ICD-10-CM

## 2022-09-01 DIAGNOSIS — N92.0 MENORRHAGIA WITH REGULAR CYCLE: ICD-10-CM

## 2022-09-01 LAB
C TRACH DNA SPEC QL PROBE+SIG AMP: NEGATIVE
N GONORRHOEA DNA SPEC QL NAA+PROBE: NEGATIVE

## 2022-09-01 PROCEDURE — 99395 PREV VISIT EST AGE 18-39: CPT | Performed by: FAMILY MEDICINE

## 2022-09-01 PROCEDURE — 87491 CHLMYD TRACH DNA AMP PROBE: CPT | Mod: ZL | Performed by: FAMILY MEDICINE

## 2022-09-01 PROCEDURE — 87591 N.GONORRHOEAE DNA AMP PROB: CPT | Mod: ZL | Performed by: FAMILY MEDICINE

## 2022-09-01 RX ORDER — NORGESTREL-ETHINYL ESTRADIOL 0.3-0.03MG
1 TABLET ORAL DAILY
Qty: 84 TABLET | Refills: 3 | Status: SHIPPED | OUTPATIENT
Start: 2022-09-01 | End: 2023-07-05

## 2022-09-01 RX ORDER — CITALOPRAM HYDROBROMIDE 40 MG/1
40 TABLET ORAL DAILY
Qty: 90 TABLET | Refills: 3 | Status: SHIPPED | OUTPATIENT
Start: 2022-09-01 | End: 2023-09-11

## 2022-09-01 RX ORDER — TRAZODONE HYDROCHLORIDE 50 MG/1
50 TABLET, FILM COATED ORAL AT BEDTIME
Qty: 90 TABLET | Refills: 3 | Status: CANCELLED | OUTPATIENT
Start: 2022-09-01

## 2022-09-01 ASSESSMENT — ANXIETY QUESTIONNAIRES
5. BEING SO RESTLESS THAT IT IS HARD TO SIT STILL: SEVERAL DAYS
GAD7 TOTAL SCORE: 3
4. TROUBLE RELAXING: MORE THAN HALF THE DAYS
1. FEELING NERVOUS, ANXIOUS, OR ON EDGE: NOT AT ALL
IF YOU CHECKED OFF ANY PROBLEMS ON THIS QUESTIONNAIRE, HOW DIFFICULT HAVE THESE PROBLEMS MADE IT FOR YOU TO DO YOUR WORK, TAKE CARE OF THINGS AT HOME, OR GET ALONG WITH OTHER PEOPLE: NOT DIFFICULT AT ALL
GAD7 TOTAL SCORE: 3
3. WORRYING TOO MUCH ABOUT DIFFERENT THINGS: NOT AT ALL
GAD7 TOTAL SCORE: 3
2. NOT BEING ABLE TO STOP OR CONTROL WORRYING: NOT AT ALL
8. IF YOU CHECKED OFF ANY PROBLEMS, HOW DIFFICULT HAVE THESE MADE IT FOR YOU TO DO YOUR WORK, TAKE CARE OF THINGS AT HOME, OR GET ALONG WITH OTHER PEOPLE?: NOT DIFFICULT AT ALL
7. FEELING AFRAID AS IF SOMETHING AWFUL MIGHT HAPPEN: NOT AT ALL
7. FEELING AFRAID AS IF SOMETHING AWFUL MIGHT HAPPEN: NOT AT ALL
6. BECOMING EASILY ANNOYED OR IRRITABLE: NOT AT ALL

## 2022-09-01 ASSESSMENT — ENCOUNTER SYMPTOMS
CONSTIPATION: 0
ABDOMINAL PAIN: 0
DYSURIA: 0
FREQUENCY: 0
HEADACHES: 1
SORE THROAT: 0
PALPITATIONS: 0
DIARRHEA: 0
WEAKNESS: 0
EYE PAIN: 0
MYALGIAS: 1
CHILLS: 0
DIZZINESS: 0
NAUSEA: 0
PARESTHESIAS: 0
HEMATURIA: 0
JOINT SWELLING: 0
HEMATOCHEZIA: 0
FEVER: 0
HEARTBURN: 0
BREAST MASS: 0
COUGH: 0
SHORTNESS OF BREATH: 0
NERVOUS/ANXIOUS: 0
ARTHRALGIAS: 0

## 2022-09-01 ASSESSMENT — PATIENT HEALTH QUESTIONNAIRE - PHQ9
10. IF YOU CHECKED OFF ANY PROBLEMS, HOW DIFFICULT HAVE THESE PROBLEMS MADE IT FOR YOU TO DO YOUR WORK, TAKE CARE OF THINGS AT HOME, OR GET ALONG WITH OTHER PEOPLE: SOMEWHAT DIFFICULT
SUM OF ALL RESPONSES TO PHQ QUESTIONS 1-9: 9
SUM OF ALL RESPONSES TO PHQ QUESTIONS 1-9: 9

## 2022-09-01 ASSESSMENT — PAIN SCALES - GENERAL: PAINLEVEL: NO PAIN (0)

## 2022-09-01 NOTE — PROGRESS NOTES
SUBJECTIVE:   CC: Christy Bailey is an 22 year old woman who presents for preventive health visit.     Chari is going to be moving to [place], [place] later this month.  She is in need of medication refills.    Patient has been advised of split billing requirements and indicates understanding: Yes     Healthy Habits:     Getting at least 3 servings of Calcium per day:  Yes    Bi-annual eye exam:  Yes    Dental care twice a year:  NO    Sleep apnea or symptoms of sleep apnea:  None    Diet:  Regular (no restrictions)    Frequency of exercise:  1 day/week    Duration of exercise:  30-45 minutes    Taking medications regularly:  Yes    Medication side effects:  None    PHQ-2 Total Score: 1    Additional concerns today:  Yes        Today's PHQ-2 Score:   PHQ-2 ( 1999 Pfizer) 9/1/2022   Q1: Little interest or pleasure in doing things 1   Q2: Feeling down, depressed or hopeless 0   PHQ-2 Score 1   PHQ-2 Total Score (12-17 Years)- Positive if 3 or more points; Administer PHQ-A if positive -   Q1: Little interest or pleasure in doing things Several days   Q2: Feeling down, depressed or hopeless Not at all   PHQ-2 Score 1       Abuse: Current or Past (Physical, Sexual or Emotional) - No  Do you feel safe in your environment? Yes    Have you ever done Advance Care Planning? (For example, a Health Directive, POLST, or a discussion with a medical provider or your loved ones about your wishes): No, advance care planning information given to patient to review.  Patient declined advance care planning discussion at this time.    Social History     Tobacco Use     Smoking status: Never Smoker     Smokeless tobacco: Never Used   Substance Use Topics     Alcohol use: Yes     Comment: 1 per month     If you drink alcohol do you typically have >3 drinks per day or >7 drinks per week? No    Alcohol Use 9/1/2022   Prescreen: >3 drinks/day or >7 drinks/week? No   Prescreen: >3 drinks/day or >7 drinks/week? -       Reviewed orders with  patient.  Reviewed health maintenance and updated orders accordingly - Yes  Labs reviewed in EPIC  BP Readings from Last 3 Encounters:   22 114/70   22 106/72   21 102/66    Wt Readings from Last 3 Encounters:   22 51.4 kg (113 lb 6.4 oz)   22 50.3 kg (111 lb)   21 50.3 kg (111 lb)                  Patient Active Problem List   Diagnosis     Contact dermatitis and eczema     Iron deficiency     Menorrhagia with regular cycle     Murmur     Anxiety and depression     Hair loss     No past surgical history on file.    Social History     Tobacco Use     Smoking status: Never Smoker     Smokeless tobacco: Never Used   Substance Use Topics     Alcohol use: Yes     Comment: 1 per month     Family History   Problem Relation Age of Onset     Other - See Comments Mother         GI Disease,ulcerative colitis     Allergy (Severe) Mother         Allergies,seasonal     Asthma Mother         Asthma     Family History Negative Father         Good Health     Other - See Comments Paternal Grandfather         sarcoidosis     Ovarian Cancer Paternal Grandmother 52        Cancer-ovarian, of ovarian cancer. diabetes on her side of family     Other - See Comments Brother         complicated  course.  See chart for details.     Family History Negative Sister         Good Health         Current Outpatient Medications   Medication Sig Dispense Refill     citalopram (CELEXA) 40 MG tablet Take 1 tablet (40 mg) by mouth daily 90 tablet 3     Multiple Vitamin (MULTI-VITAMINS) TABS Take 1 tablet by mouth daily       norgestrel-ethinyl estradiol (CRYSELLE-28) 0.3-30 MG-MCG tablet Take 1 tablet by mouth daily 84 tablet 3     order for DME SAD light.  Diagnosis:  F33.9, F41.8.  Length of need:  99.       traZODone (DESYREL) 50 MG tablet Take 1 tablet (50 mg) by mouth At Bedtime 90 tablet 3     vitamin D3 (CHOLECALCIFEROL) 50 mcg (2000 units) tablet Take 2 tablets by mouth daily       Allergies  "  Allergen Reactions     Cat Hair Extract        Breast Cancer Screening:        History of abnormal Pap smear: NO - age 21-29 PAP every 3 years recommended  PAP / HPV 3/23/2021   PAP (Historical) NIL     Reviewed and updated as needed this visit by clinical staff   Tobacco  Allergies  Meds                Reviewed and updated as needed this visit by Provider                   Past Medical History:   Diagnosis Date     Type O blood, Rh positive       No past surgical history on file.    Review of Systems  CONSTITUTIONAL: NEGATIVE for fever, chills, change in weight  INTEGUMENTARU/SKIN: NEGATIVE for worrisome rashes, moles or lesions  EYES: NEGATIVE for vision changes or irritation  ENT: NEGATIVE for ear, mouth and throat problems  RESP: NEGATIVE for significant cough or SOB  BREAST: NEGATIVE for masses, tenderness or discharge  CV: NEGATIVE for chest pain, palpitations or peripheral edema  GI: NEGATIVE for nausea, abdominal pain, heartburn, or change in bowel habits  : NEGATIVE for unusual urinary or vaginal symptoms. Periods are regular.  MUSCULOSKELETAL: NEGATIVE for significant arthralgias or myalgia  NEURO: NEGATIVE for weakness, dizziness or paresthesias  PSYCHIATRIC: NEGATIVE for changes in mood or affect     OBJECTIVE:   /70 (BP Location: Right arm, Patient Position: Sitting, Cuff Size: Adult Regular)   Pulse 82   Temp 98.6  F (37  C) (Tympanic)   Resp 16   Ht 1.664 m (5' 5.5\")   Wt 51.4 kg (113 lb 6.4 oz)   LMP 08/15/2022   SpO2 93%   Breastfeeding No   BMI 18.58 kg/m    Physical Exam  Constitutional:       General: She is not in acute distress.     Appearance: She is well-developed.   HENT:      Head: Normocephalic.      Right Ear: Tympanic membrane and external ear normal.      Left Ear: Tympanic membrane and external ear normal.      Nose: Nose normal.      Mouth/Throat:      Pharynx: No oropharyngeal exudate.   Eyes:      General:         Right eye: No discharge.         Left eye: No " discharge.      Conjunctiva/sclera: Conjunctivae normal.      Pupils: Pupils are equal, round, and reactive to light.   Neck:      Thyroid: No thyromegaly.      Trachea: No tracheal deviation.   Cardiovascular:      Rate and Rhythm: Normal rate and regular rhythm.      Pulses: Normal pulses.      Heart sounds: Normal heart sounds, S1 normal and S2 normal. No murmur heard.    No friction rub. No gallop. No S3 or S4 sounds.   Pulmonary:      Effort: Pulmonary effort is normal. No respiratory distress.      Breath sounds: Normal breath sounds. No wheezing or rales.      Comments: Breast exam:  No masses palpable bilaterally.  No skin changes, tethering or axillary lymphadenopathy bilaterally.    Abdominal:      General: Bowel sounds are normal. There is no distension.      Palpations: Abdomen is soft. There is no mass.      Tenderness: There is no abdominal tenderness.   Genitourinary:     Comments: Pelvic/Rectal exams deferred per patient.  Musculoskeletal:         General: Normal range of motion.      Cervical back: Neck supple.   Lymphadenopathy:      Cervical: No cervical adenopathy.   Skin:     General: Skin is warm and dry.      Findings: No rash.   Neurological:      Mental Status: She is alert and oriented to person, place, and time.      Motor: No abnormal muscle tone.      Deep Tendon Reflexes: Reflexes are normal and symmetric.   Psychiatric:         Thought Content: Thought content normal.         Judgment: Judgment normal.       PHQ 3/23/2021 3/4/2022 9/1/2022   PHQ-9 Total Score 13 7 9   Q9: Thoughts of better off dead/self-harm past 2 weeks Not at all Not at all Not at all   F/U: Thoughts of suicide or self-harm - - -   F/U: Safety concerns - - -     TOMMY-7 SCORE 3/23/2021 3/4/2022 9/1/2022   Total Score - 1 (minimal anxiety) 3 (minimal anxiety)   Total Score 5 1 3             ASSESSMENT/PLAN:       ICD-10-CM    1. Health care maintenance  Z00.00    2. Menorrhagia with regular cycle  N92.0  "norgestrel-ethinyl estradiol (CRYSELLE-28) 0.3-30 MG-MCG tablet   3. Anxiety and depression  F41.9 citalopram (CELEXA) 40 MG tablet    F32.A    4. Routine screening for STI (sexually transmitted infection)  Z11.3 GC/Chlamydia by PCR     GC/Chlamydia by PCR     1.  Pap Smear is up to date (3/23/21) and was normal at that time.  Tdap last completed 3/23/21.  She has had 3 doses of covid booster at this time and is up to date.    2.  Cryselle refilled as above.  3.  Stable.  Celexa refilled.  4.  Screen for Gonorrhea/Chlamydia as above.      Patient has been advised of split billing requirements and indicates understanding: Yes    COUNSELING:  Reviewed preventive health counseling, as reflected in patient instructions       Regular exercise       Healthy diet/nutrition       Vision screening       Contraception       Safe sex practices/STD prevention    Estimated body mass index is 18.58 kg/m  as calculated from the following:    Height as of this encounter: 1.664 m (5' 5.5\").    Weight as of this encounter: 51.4 kg (113 lb 6.4 oz).        She reports that she has never smoked. She has never used smokeless tobacco.      Counseling Resources:  ATP IV Guidelines  Pooled Cohorts Equation Calculator  Breast Cancer Risk Calculator  BRCA-Related Cancer Risk Assessment: FHS-7 Tool  FRAX Risk Assessment  ICSI Preventive Guidelines  Dietary Guidelines for Americans, 2010  USDA's MyPlate  ASA Prophylaxis  Lung CA Screening    Charley Gonzales MD  [facility] [place]  Answers for HPI/ROS submitted by the patient on 9/1/2022  If you checked off any problems, how difficult have these problems made it for you to do your work, take care of things at home, or get along with other people?: Somewhat difficult  PHQ9 TOTAL SCORE: 9  TOMMY 7 TOTAL SCORE: 3      "

## 2022-09-01 NOTE — NURSING NOTE
Chief Complaint   Patient presents with     Physical     Will be moving to [facility] in a few weeks and would like to discuss a few things before leaving.     Medication Reconciliation: complete    Fern Fernando, LPN

## 2023-07-05 DIAGNOSIS — N92.0 MENORRHAGIA WITH REGULAR CYCLE: ICD-10-CM

## 2023-07-05 RX ORDER — NORGESTREL AND ETHINYL ESTRADIOL 0.3-0.03MG
KIT ORAL
Qty: 84 TABLET | Refills: 3 | Status: SHIPPED | OUTPATIENT
Start: 2023-07-05 | End: 2024-01-03

## 2023-10-07 ENCOUNTER — HEALTH MAINTENANCE LETTER (OUTPATIENT)
Age: 23
End: 2023-10-07

## 2023-12-03 DIAGNOSIS — F41.9 ANXIETY AND DEPRESSION: ICD-10-CM

## 2023-12-03 DIAGNOSIS — F32.A ANXIETY AND DEPRESSION: ICD-10-CM

## 2023-12-07 RX ORDER — CITALOPRAM HYDROBROMIDE 40 MG/1
TABLET ORAL
Qty: 90 TABLET | Refills: 0 | Status: SHIPPED | OUTPATIENT
Start: 2023-12-07 | End: 2024-01-03

## 2023-12-07 NOTE — TELEPHONE ENCOUNTER
[id number] sent Rx request for the following:      Requested Prescriptions   Pending Prescriptions Disp Refills    citalopram (CELEXA) 40 MG tablet [Pharmacy Med Name: CITALOPRAM 40MG TABLET] 90 tablet 0     Sig: TAKE 1 TABLET (40 MG) BY MOUTH EVERY DAY       SSRIs Protocol Failed - 12/3/2023  5:04 AM        Failed - PHQ-9 score less than 5 in past 6 months     Please review last PHQ-9 score.           Failed - Recent (6 mo) or future (30 days) visit within the authorizing provider's specialty       Last Prescription Date:   9/11/23  Last Fill Qty/Refills:         90, R-0    Last Office Visit:              9/1/22   Future Office visit:           1/3/24    Routing refill request to provider for review/approval because:  Drug not on the [facility] refill protocol     Romelia Morejon RN on 12/7/2023 at 3:22 PM

## 2024-01-03 ENCOUNTER — OFFICE VISIT (OUTPATIENT)
Dept: FAMILY MEDICINE | Facility: OTHER | Age: 24
End: 2024-01-03
Attending: STUDENT IN AN ORGANIZED HEALTH CARE EDUCATION/TRAINING PROGRAM
Payer: COMMERCIAL

## 2024-01-03 VITALS
DIASTOLIC BLOOD PRESSURE: 64 MMHG | SYSTOLIC BLOOD PRESSURE: 114 MMHG | RESPIRATION RATE: 18 BRPM | HEART RATE: 113 BPM | OXYGEN SATURATION: 97 % | BODY MASS INDEX: 19.85 KG/M2 | HEIGHT: 65 IN | TEMPERATURE: 98.6 F | WEIGHT: 119.13 LBS

## 2024-01-03 DIAGNOSIS — Z00.00 ROUTINE GENERAL MEDICAL EXAMINATION AT A HEALTH CARE FACILITY: Primary | ICD-10-CM

## 2024-01-03 DIAGNOSIS — F32.A ANXIETY AND DEPRESSION: ICD-10-CM

## 2024-01-03 DIAGNOSIS — N92.0 MENORRHAGIA WITH REGULAR CYCLE: ICD-10-CM

## 2024-01-03 DIAGNOSIS — F41.9 ANXIETY AND DEPRESSION: ICD-10-CM

## 2024-01-03 DIAGNOSIS — F51.01 PRIMARY INSOMNIA: ICD-10-CM

## 2024-01-03 LAB
ALBUMIN SERPL BCG-MCNC: 4.6 G/DL (ref 3.5–5.2)
ALP SERPL-CCNC: 58 U/L (ref 40–150)
ALT SERPL W P-5'-P-CCNC: 18 U/L (ref 0–50)
ANION GAP SERPL CALCULATED.3IONS-SCNC: 11 MMOL/L (ref 7–15)
AST SERPL W P-5'-P-CCNC: 17 U/L (ref 0–45)
BASOPHILS # BLD AUTO: 0 10E3/UL (ref 0–0.2)
BASOPHILS NFR BLD AUTO: 0 %
BILIRUB SERPL-MCNC: 0.3 MG/DL
BUN SERPL-MCNC: 9 MG/DL (ref 6–20)
CALCIUM SERPL-MCNC: 9.2 MG/DL (ref 8.6–10)
CHLORIDE SERPL-SCNC: 105 MMOL/L (ref 98–107)
CREAT SERPL-MCNC: 0.64 MG/DL (ref 0.51–0.95)
DEPRECATED HCO3 PLAS-SCNC: 24 MMOL/L (ref 22–29)
EGFRCR SERPLBLD CKD-EPI 2021: >90 ML/MIN/1.73M2
EOSINOPHIL # BLD AUTO: 0.1 10E3/UL (ref 0–0.7)
EOSINOPHIL NFR BLD AUTO: 1 %
ERYTHROCYTE [DISTWIDTH] IN BLOOD BY AUTOMATED COUNT: 11.9 % (ref 10–15)
GLUCOSE SERPL-MCNC: 89 MG/DL (ref 70–99)
HCT VFR BLD AUTO: 34.9 % (ref 35–47)
HGB BLD-MCNC: 12 G/DL (ref 11.7–15.7)
IMM GRANULOCYTES # BLD: 0 10E3/UL
IMM GRANULOCYTES NFR BLD: 0 %
LYMPHOCYTES # BLD AUTO: 1.4 10E3/UL (ref 0.8–5.3)
LYMPHOCYTES NFR BLD AUTO: 28 %
MCH RBC QN AUTO: 31.5 PG (ref 26.5–33)
MCHC RBC AUTO-ENTMCNC: 34.4 G/DL (ref 31.5–36.5)
MCV RBC AUTO: 92 FL (ref 78–100)
MONOCYTES # BLD AUTO: 0.3 10E3/UL (ref 0–1.3)
MONOCYTES NFR BLD AUTO: 7 %
NEUTROPHILS # BLD AUTO: 3.2 10E3/UL (ref 1.6–8.3)
NEUTROPHILS NFR BLD AUTO: 64 %
NRBC # BLD AUTO: 0 10E3/UL
NRBC BLD AUTO-RTO: 0 /100
PLATELET # BLD AUTO: 224 10E3/UL (ref 150–450)
POTASSIUM SERPL-SCNC: 3.9 MMOL/L (ref 3.4–5.3)
PROT SERPL-MCNC: 7.4 G/DL (ref 6.4–8.3)
RBC # BLD AUTO: 3.81 10E6/UL (ref 3.8–5.2)
SODIUM SERPL-SCNC: 140 MMOL/L (ref 135–145)
WBC # BLD AUTO: 5.1 10E3/UL (ref 4–11)

## 2024-01-03 PROCEDURE — G0123 SCREEN CERV/VAG THIN LAYER: HCPCS | Performed by: STUDENT IN AN ORGANIZED HEALTH CARE EDUCATION/TRAINING PROGRAM

## 2024-01-03 PROCEDURE — 86803 HEPATITIS C AB TEST: CPT | Mod: ZL | Performed by: STUDENT IN AN ORGANIZED HEALTH CARE EDUCATION/TRAINING PROGRAM

## 2024-01-03 PROCEDURE — 85025 COMPLETE CBC W/AUTO DIFF WBC: CPT | Mod: ZL | Performed by: STUDENT IN AN ORGANIZED HEALTH CARE EDUCATION/TRAINING PROGRAM

## 2024-01-03 PROCEDURE — 87389 HIV-1 AG W/HIV-1&-2 AB AG IA: CPT | Mod: ZL | Performed by: STUDENT IN AN ORGANIZED HEALTH CARE EDUCATION/TRAINING PROGRAM

## 2024-01-03 PROCEDURE — 80053 COMPREHEN METABOLIC PANEL: CPT | Mod: ZL | Performed by: STUDENT IN AN ORGANIZED HEALTH CARE EDUCATION/TRAINING PROGRAM

## 2024-01-03 PROCEDURE — 99395 PREV VISIT EST AGE 18-39: CPT | Performed by: STUDENT IN AN ORGANIZED HEALTH CARE EDUCATION/TRAINING PROGRAM

## 2024-01-03 PROCEDURE — 36415 COLL VENOUS BLD VENIPUNCTURE: CPT | Mod: ZL | Performed by: STUDENT IN AN ORGANIZED HEALTH CARE EDUCATION/TRAINING PROGRAM

## 2024-01-03 RX ORDER — NORGESTREL AND ETHINYL ESTRADIOL 0.3-0.03MG
1 KIT ORAL DAILY
Qty: 84 TABLET | Refills: 3 | Status: SHIPPED | OUTPATIENT
Start: 2024-01-03 | End: 2024-09-04

## 2024-01-03 RX ORDER — CITALOPRAM HYDROBROMIDE 40 MG/1
TABLET ORAL
Qty: 90 TABLET | Refills: 3 | Status: SHIPPED | OUTPATIENT
Start: 2024-01-03 | End: 2024-09-04

## 2024-01-03 RX ORDER — TRAZODONE HYDROCHLORIDE 50 MG/1
50 TABLET, FILM COATED ORAL AT BEDTIME
Qty: 90 TABLET | Refills: 3 | Status: SHIPPED | OUTPATIENT
Start: 2024-01-03

## 2024-01-03 ASSESSMENT — ENCOUNTER SYMPTOMS
DYSURIA: 0
HEARTBURN: 0
DIZZINESS: 0
HEMATOCHEZIA: 0
FEVER: 0
PALPITATIONS: 0
SORE THROAT: 0
DIARRHEA: 0
HEMATURIA: 0
JOINT SWELLING: 0
EYE PAIN: 0
CONSTIPATION: 0
PARESTHESIAS: 0
SHORTNESS OF BREATH: 0
NAUSEA: 0
COUGH: 0
HEADACHES: 0
NERVOUS/ANXIOUS: 0
ARTHRALGIAS: 0
WEAKNESS: 0
ABDOMINAL PAIN: 0
CHILLS: 0
MYALGIAS: 0
FREQUENCY: 0

## 2024-01-03 ASSESSMENT — ANXIETY QUESTIONNAIRES
8. IF YOU CHECKED OFF ANY PROBLEMS, HOW DIFFICULT HAVE THESE MADE IT FOR YOU TO DO YOUR WORK, TAKE CARE OF THINGS AT HOME, OR GET ALONG WITH OTHER PEOPLE?: NOT DIFFICULT AT ALL
GAD7 TOTAL SCORE: 1
1. FEELING NERVOUS, ANXIOUS, OR ON EDGE: NOT AT ALL
5. BEING SO RESTLESS THAT IT IS HARD TO SIT STILL: NOT AT ALL
7. FEELING AFRAID AS IF SOMETHING AWFUL MIGHT HAPPEN: NOT AT ALL
IF YOU CHECKED OFF ANY PROBLEMS ON THIS QUESTIONNAIRE, HOW DIFFICULT HAVE THESE PROBLEMS MADE IT FOR YOU TO DO YOUR WORK, TAKE CARE OF THINGS AT HOME, OR GET ALONG WITH OTHER PEOPLE: NOT DIFFICULT AT ALL
GAD7 TOTAL SCORE: 1
3. WORRYING TOO MUCH ABOUT DIFFERENT THINGS: NOT AT ALL
7. FEELING AFRAID AS IF SOMETHING AWFUL MIGHT HAPPEN: NOT AT ALL
6. BECOMING EASILY ANNOYED OR IRRITABLE: NOT AT ALL
4. TROUBLE RELAXING: SEVERAL DAYS
GAD7 TOTAL SCORE: 1
2. NOT BEING ABLE TO STOP OR CONTROL WORRYING: NOT AT ALL

## 2024-01-03 ASSESSMENT — PATIENT HEALTH QUESTIONNAIRE - PHQ9
SUM OF ALL RESPONSES TO PHQ QUESTIONS 1-9: 3
10. IF YOU CHECKED OFF ANY PROBLEMS, HOW DIFFICULT HAVE THESE PROBLEMS MADE IT FOR YOU TO DO YOUR WORK, TAKE CARE OF THINGS AT HOME, OR GET ALONG WITH OTHER PEOPLE: SOMEWHAT DIFFICULT
SUM OF ALL RESPONSES TO PHQ QUESTIONS 1-9: 3

## 2024-01-03 ASSESSMENT — PAIN SCALES - GENERAL: PAINLEVEL: NO PAIN (0)

## 2024-01-03 NOTE — NURSING NOTE
"Medication Reconciliation: Complete    Bisi Woods LPN  1/3/2024 3:16 PM  Chief Complaint   Patient presents with    Physical     Annual exam       Initial /64 (BP Location: Right arm, Patient Position: Sitting, Cuff Size: Adult Regular)   Pulse 113   Temp 98.6  F (37  C) (Tympanic)   Resp 18   Ht 1.651 m (5' 5\")   Wt 54 kg (119 lb 2 oz)   LMP 12/11/2023 (Approximate)   SpO2 97%   BMI 19.82 kg/m   Estimated body mass index is 19.82 kg/m  as calculated from the following:    Height as of this encounter: 1.651 m (5' 5\").    Weight as of this encounter: 54 kg (119 lb 2 oz).  Medication Review: complete    The next two questions are to help us understand your food security.  If you are feeling you need any assistance in this area, we have resources available to support you today.          1/3/2024   SDOH- Food Insecurity   Within the past 12 months, did you worry that your food would run out before you got money to buy more? N   Within the past 12 months, did the food you bought just not last and you didn t have money to get more? N         Health Care Directive:  Patient does not have a Health Care Directive or Living Will: Discussed advance care planning with patient; however, patient declined at this time.    Bisi Woods LPN      "

## 2024-01-03 NOTE — PROGRESS NOTES
SUBJECTIVE:   Christy is a 23 year old, presenting for the following:  Physical (Annual exam)        Healthy Habits:     Getting at least 3 servings of Calcium per day:  NO    Bi-annual eye exam:  Yes    Dental care twice a year:  NO    Sleep apnea or symptoms of sleep apnea:  None    Diet:  Regular (no restrictions) and Breakfast skipped    Frequency of exercise:  1 day/week    Duration of exercise:  15-30 minutes    Taking medications regularly:  Yes    Medication side effects:  None    Additional concerns today:  Yes  Answers submitted by the patient for this visit:  Patient Health Questionnaire (Submitted on 1/3/2024)  If you checked off any problems, how difficult have these problems made it for you to do your work, take care of things at home, or get along with other people?: Somewhat difficult  PHQ9 TOTAL SCORE: 3  TOMMY-7 (Submitted on 1/3/2024)  TOMMY 7 TOTAL SCORE: 1      Today's PHQ-9 Score:       1/3/2024     3:07 PM   PHQ-9 SCORE   PHQ-9 Total Score MyChart 3 (Minimal depression)   PHQ-9 Total Score 3                       Social History     Tobacco Use    Smoking status: Never    Smokeless tobacco: Never   Substance Use Topics    Alcohol use: Yes     Comment: 1 per month             1/3/2024     3:13 PM   Alcohol Use   Prescreen: >3 drinks/day or >7 drinks/week? No     Reviewed orders with patient.  Reviewed health maintenance and updated orders accordingly - Yes  Labs reviewed in EPIC    Breast Cancer Screening:    FHS-7:        No data to display                Patient under 40 years of age: Routine Mammogram Screening not recommended.   Pertinent mammograms are reviewed under the imaging tab.    History of abnormal Pap smear: Last 3 Pap and HPV Results:       3/23/2021     2:20 PM   PAP / HPV   PAP (Historical) NIL          3/23/2021     2:20 PM   PAP / HPV   PAP (Historical) NIL      Reviewed and updated as needed this visit by clinical staff   Tobacco  Allergies  Meds              Reviewed and  "updated as needed this visit by Provider                     Review of Systems   Constitutional:  Negative for chills and fever.   HENT:  Positive for congestion and hearing loss. Negative for ear pain and sore throat.    Eyes:  Negative for pain and visual disturbance.   Respiratory:  Negative for cough and shortness of breath.    Cardiovascular:  Negative for chest pain, palpitations and peripheral edema.   Gastrointestinal:  Negative for abdominal pain, constipation, diarrhea, heartburn, hematochezia and nausea.   Genitourinary:  Negative for dysuria, frequency, genital sores, hematuria and urgency.   Musculoskeletal:  Negative for arthralgias, joint swelling and myalgias.   Skin:  Negative for rash.   Neurological:  Negative for dizziness, weakness, headaches and paresthesias.   Psychiatric/Behavioral:  Negative for mood changes. The patient is not nervous/anxious.           OBJECTIVE:   /64 (BP Location: Right arm, Patient Position: Sitting, Cuff Size: Adult Regular)   Pulse 113   Temp 98.6  F (37  C) (Tympanic)   Resp 18   Ht 1.651 m (5' 5\")   Wt 54 kg (119 lb 2 oz)   LMP 12/11/2023 (Approximate)   SpO2 97%   BMI 19.82 kg/m    Physical Exam  GENERAL: healthy, alert and no distress  EYES: Eyes grossly normal to inspection, PERRL and conjunctivae and sclerae normal  HENT: ear canals and TM's normal, nose and mouth without ulcers or lesions  RESP: lungs clear to auscultation - no rales, rhonchi or wheezes  CV: regular rate and rhythm, normal S1 S2, no S3 or S4, no murmur, click or rub, no peripheral edema and peripheral pulses strong  ABDOMEN: soft, nontender, no hepatosplenomegaly, no masses and bowel sounds normal   (female): normal female external genitalia, normal urethral meatus, vaginal mucosa, normal cervix/adnexa/uterus without masses or discharge  NEURO: Normal strength and tone, mentation intact and speech normal  PSYCH: mentation appears normal, affect normal/bright    Diagnostic Test " Results:  Labs reviewed in Epic    ASSESSMENT/PLAN:   Christy was seen today for physical.    Diagnoses and all orders for this visit:    Routine general medical examination at a health care facility  -     PAP screen reflex to HPV if ASCUS - recommended age 25 - 29 years  -     CBC and Differential; Future  -     Comprehensive Metabolic Panel; Future  -     HIV Antigen Antibody Combo; Future  -     Hepatitis C Screen Reflex to HCV RNA Quant and Genotype; Future  -     Hepatitis C Screen Reflex to HCV RNA Quant and Genotype  -     HIV Antigen Antibody Combo  -     Comprehensive Metabolic Panel  -     CBC and Differential    Anxiety and depression  -     citalopram (CELEXA) 40 MG tablet; TAKE 1 TABLET (40 MG) BY MOUTH EVERY DAY    Menorrhagia with regular cycle  -     norgestrel-ethinyl estradiol (ELINEST) 0.3-30 MG-MCG tablet; Take 1 tablet by mouth daily    Primary insomnia  -     traZODone (DESYREL) 50 MG tablet; Take 1 tablet (50 mg) by mouth at bedtime        Chronic conditions all stable. Meds refilled      COUNSELING:  Reviewed preventive health counseling, as reflected in patient instructions       Regular exercise       Healthy diet/nutrition        She reports that she has never smoked. She has never used smokeless tobacco.          Mike Lafleur MD  [facility]

## 2024-01-04 LAB
BKR LAB AP GYN ADEQUACY: NORMAL
BKR LAB AP GYN INTERPRETATION: NORMAL
BKR LAB AP HPV REFLEX: NORMAL
BKR LAB AP LMP: NORMAL
BKR LAB AP PREVIOUS ABNORMAL: NORMAL
HCV AB SERPL QL IA: NONREACTIVE
HIV 1+2 AB+HIV1 P24 AG SERPL QL IA: NONREACTIVE
PATH REPORT.COMMENTS IMP SPEC: NORMAL
PATH REPORT.COMMENTS IMP SPEC: NORMAL
PATH REPORT.RELEVANT HX SPEC: NORMAL

## 2024-08-30 DIAGNOSIS — F32.A ANXIETY AND DEPRESSION: ICD-10-CM

## 2024-08-30 DIAGNOSIS — N92.0 MENORRHAGIA WITH REGULAR CYCLE: ICD-10-CM

## 2024-08-30 DIAGNOSIS — F41.9 ANXIETY AND DEPRESSION: ICD-10-CM

## 2024-09-04 RX ORDER — CITALOPRAM HYDROBROMIDE 40 MG/1
40 TABLET ORAL DAILY
Qty: 90 TABLET | Refills: 0 | Status: SHIPPED | OUTPATIENT
Start: 2024-09-04

## 2024-09-04 RX ORDER — NORGESTREL AND ETHINYL ESTRADIOL 0.3-0.03MG
1 KIT ORAL DAILY
Qty: 84 TABLET | Refills: 0 | Status: SHIPPED | OUTPATIENT
Start: 2024-09-04

## 2024-09-04 NOTE — TELEPHONE ENCOUNTER
[facility] sent Rx request for the following:    citalopram (CELEXA) 40 MG tablet 90 tablet 3 1/3/2024 -- No   Sig: TAKE 1 TABLET (40 MG) BY MOUTH EVERY DAY   Sent to pharmacy as: Citalopram Hydrobromide 40 MG Oral Tablet (celeXA)   Class: E-Prescribe   Order: [id number]   E-Prescribing Status: Receipt confirmed by pharmacy (1/3/2024  3:27 PM CST)     norgestrel-ethinyl estradiol (ELINEST) 0.3-30 MG-MCG tablet 84 tablet 3 1/3/2024 -- No   Sig - Route: Take 1 tablet by mouth daily - Oral   Sent to pharmacy as: Elinest 0.3-30 MG-MCG Oral Tablet (norgestrel-ethinyl estradiol)   Class: E-Prescribe   Order: [id number]   E-Prescribing Status: Receipt confirmed by pharmacy (1/3/2024  3:27 PM CST)     [id number] ([place]) - [place], [place] - [address]     New prescriptions sent to mail order pharmacy, per above written orders.     Warnings Override History for citalopram (CELEXA) 40 MG tablet [[id number]]    Overridden by Mike Nuñez MD on Mina 3, 2024 3:27 PM   Drug-Drug   1. SELECTIVE SEROTONIN REUPTAKE INHIBITORS / SEROTONERGIC NON-OPIOID CNS DEPRESSANTS [Level: Major] [Reason: Will monitor drug levels/drug effects ]   Other Orders: traZODone (DESYREL) 50 MG tablet        Bisi Gimenez RN .............. 9/4/2024  8:36 AM

## 2024-12-01 DIAGNOSIS — F51.01 PRIMARY INSOMNIA: ICD-10-CM

## 2024-12-02 RX ORDER — TRAZODONE HYDROCHLORIDE 50 MG/1
50 TABLET, FILM COATED ORAL AT BEDTIME
Qty: 90 TABLET | Refills: 3 | OUTPATIENT
Start: 2024-12-02

## 2024-12-02 NOTE — TELEPHONE ENCOUNTER
[id number] sent Rx request for the following:      Redundant refill request refused: Too soon:    Requested Prescriptions   Pending Prescriptions Disp Refills    traZODone (DESYREL) 50 MG tablet [Pharmacy Med Name: TRAZODONE 50MG TABLET] 90 tablet 3     Sig: TAKE 1 TABLET (50 MG) BY MOUTH AT BEDTIME       Serotonin Modulators Passed - 12/2/2024  4:54 PM        Passed - Medication is active on med list        Passed - Recent (12 mo) or future (90 days) visit within the authorizing provider's specialty     The patient must have completed an in-person or virtual visit within the past 12 months or has a future visit scheduled within the next 90 days with the authorizing provider s specialty.  Urgent care and e-visits do not qualify as an office visit for this protocol.          Passed - Medication indicated for associated diagnosis     Medication is associated with one or more of the following diagnoses:     Depression   Insomnia          Passed - Patient is age 18 or older        Passed - No active pregnancy on record        Passed - No positive pregnancy test in past 12 months             Last Prescription Date:   1/3/24  Last Fill Qty/Refills:         90, R-3    Last Office Visit:              1/3/24   Future Office visit:            None    Unable to complete prescription refill per RN Medication Refill Policy.     Redundant refill request refused: Too soon:Redundant refill request refused: Too soon:    Ricardo Raymundo RN on 12/2/2024 at 4:55 PM

## 2024-12-04 ENCOUNTER — PATIENT OUTREACH (OUTPATIENT)
Dept: CARE COORDINATION | Facility: CLINIC | Age: 24
End: 2024-12-04
Payer: COMMERCIAL

## 2024-12-18 ENCOUNTER — PATIENT OUTREACH (OUTPATIENT)
Dept: CARE COORDINATION | Facility: CLINIC | Age: 24
End: 2024-12-18
Payer: COMMERCIAL

## 2025-01-02 DIAGNOSIS — F51.01 PRIMARY INSOMNIA: ICD-10-CM

## 2025-01-04 NOTE — TELEPHONE ENCOUNTER
TRAZODONE 50MG TABLET        Last Written Prescription Date:  1/3/24  Last Fill Quantity: 90,   # refills: 3  Last Office Visit: 1/3/24  Future Office visit:       Routing refill request to provider for review/approval because:  Drug not on the [facility], [facility] or [facility] refill protocol or controlled substance.  Will forward to provider for assistance and request  to phone for appointment assistance.. Unable to complete prescription refill per RNMedication Refill Policy.................... Jeni Hilton RN ....................  1/4/2025   10:07 AM

## 2025-01-06 RX ORDER — TRAZODONE HYDROCHLORIDE 50 MG/1
50 TABLET, FILM COATED ORAL AT BEDTIME
Qty: 90 TABLET | Refills: 3 | Status: SHIPPED | OUTPATIENT
Start: 2025-01-06

## 2025-01-21 DIAGNOSIS — F41.9 ANXIETY AND DEPRESSION: ICD-10-CM

## 2025-01-21 DIAGNOSIS — F32.A ANXIETY AND DEPRESSION: ICD-10-CM

## 2025-01-22 NOTE — TELEPHONE ENCOUNTER
[id number] ([place] of [facility] [place] sent Rx request for the following:      Requested Prescriptions   Pending Prescriptions Disp Refills    citalopram (CELEXA) 40 MG tablet [Pharmacy Med Name: CITALOPRAM 40MG TABLET] 90 tablet 3     Sig: TAKE 1 TABLET (40 MG) BY MOUTH EVERY DAY       SSRIs Protocol Failed - 1/22/2025 11:15 AM        Failed - PHQ-9 score less than 5 in past 6 months     Please review last PHQ-9 score.           Failed - TOMMY-7 score of less than 5 in past 6 months.     Please review last TOMMY-7 score.           Failed - Recent (12 mo) or future (90 days) visit within the authorizing provider's specialty     The patient must have completed an in-person or virtual visit within the past 12 months or has a future visit scheduled within the next 90 days with the authorizing provider s specialty.  Urgent care and e-visits do not qualify as an office visit for this protocol.         Last Prescription Date:   1/2/25  Last Fill Qty/Refills:         90, R-3    Last Office Visit:              1/3/24   Future Office visit:           none

## 2025-01-23 RX ORDER — CITALOPRAM HYDROBROMIDE 40 MG/1
TABLET ORAL
Qty: 90 TABLET | Refills: 3 | Status: SHIPPED | OUTPATIENT
Start: 2025-01-23

## 2025-01-23 NOTE — TELEPHONE ENCOUNTER
[id number] ([facility] of [facility] [place] sent Rx request for the following:      citalopram (CELEXA) 40 MG tablet 90 tablet 3 1/2/2025 -- No   Sig - Route: Take 1 tablet (40 mg) by mouth daily. - Oral   Sent to pharmacy as: Citalopram Hydrobromide 40 MG Oral Tablet (celeXA)   Class: E-Prescribe   Order: [id number]   E-Prescribing Status: Receipt confirmed by pharmacy (1/2/2025  5:18 PM CST)     [facility] - [place], [place] - [address] [place] [address]     Please send new prescription to retail pharmacy. Unable to complete prescription refill per RN Medication Refill Policy. Bisi Gimenez RN .............. 1/23/2025  11:30 AM

## 2025-02-09 ENCOUNTER — HEALTH MAINTENANCE LETTER (OUTPATIENT)
Age: 25
End: 2025-02-09

## 2025-02-28 DIAGNOSIS — N92.0 MENORRHAGIA WITH REGULAR CYCLE: ICD-10-CM

## 2025-03-04 RX ORDER — NORGESTREL AND ETHINYL ESTRADIOL 0.3-0.03MG
1 KIT ORAL DAILY
Qty: 84 TABLET | Refills: 3 | Status: SHIPPED | OUTPATIENT
Start: 2025-03-04

## 2025-03-04 NOTE — TELEPHONE ENCOUNTER
[id number] ([facility] of [place] sent Rx request for the following:      Requested Prescriptions   Pending Prescriptions Disp Refills    ELINEST 0.3-30 MG-MCG tablet [Pharmacy Med Name: ELINEST 0.3MG-0.03MG TABLET] 84 tablet 3     Sig: TAKE 1 TABLET BY MOUTH EVERY DAY       Contraceptives Protocol Failed - 3/4/2025 10:28 AM        Failed - Recent (12 mo) or future (90 days) visit within the authorizing provider's specialty     The patient must have completed an in-person or virtual visit within the past 12 months or has a future visit scheduled within the next 90 days with the authorizing provider s specialty.  Urgent care and e-visits do not qualify as an office visit for this protocol.         Last Prescription Date:   1/2/25  Last Fill Qty/Refills:         84, R-3    Last Office Visit:              1/3/24   Future Office visit:           none    Patient is switching pharmacies    Romelia Morejon RN on 3/4/2025 at 10:39 AM
